# Patient Record
Sex: FEMALE | Race: WHITE | URBAN - NONMETROPOLITAN AREA
[De-identification: names, ages, dates, MRNs, and addresses within clinical notes are randomized per-mention and may not be internally consistent; named-entity substitution may affect disease eponyms.]

---

## 2017-02-22 DIAGNOSIS — I10 ESSENTIAL HYPERTENSION: ICD-10-CM

## 2017-02-22 RX ORDER — LISINOPRIL 10 MG/1
10 TABLET ORAL DAILY
Qty: 30 TABLET | Refills: 1 | Status: SHIPPED | OUTPATIENT
Start: 2017-02-22 | End: 2017-04-25

## 2017-04-25 ENCOUNTER — OFFICE VISIT (OUTPATIENT)
Dept: FAMILY MEDICINE CLINIC | Age: 37
End: 2017-04-25
Payer: MEDICAID

## 2017-04-25 ENCOUNTER — HOSPITAL ENCOUNTER (OUTPATIENT)
Dept: OTHER | Age: 37
Discharge: HOME | End: 2017-04-25
Attending: FAMILY MEDICINE | Admitting: FAMILY MEDICINE

## 2017-04-25 VITALS
HEART RATE: 79 BPM | OXYGEN SATURATION: 99 % | BODY MASS INDEX: 30.19 KG/M2 | DIASTOLIC BLOOD PRESSURE: 88 MMHG | SYSTOLIC BLOOD PRESSURE: 130 MMHG | RESPIRATION RATE: 18 BRPM | WEIGHT: 181.2 LBS | HEIGHT: 65 IN

## 2017-04-25 DIAGNOSIS — R53.1 WEAKNESS: ICD-10-CM

## 2017-04-25 DIAGNOSIS — E53.8 B12 DEFICIENCY: ICD-10-CM

## 2017-04-25 DIAGNOSIS — R42 DIZZINESS: Primary | ICD-10-CM

## 2017-04-25 DIAGNOSIS — I10 ESSENTIAL HYPERTENSION: ICD-10-CM

## 2017-04-25 LAB
A/G RATIO: 1.7 (ref 0.8–2)
ALBUMIN SERPL-MCNC: 4.4 G/DL (ref 3.4–4.8)
ALP BLD-CCNC: 71 U/L (ref 25–100)
ALT SERPL-CCNC: 13 U/L (ref 4–36)
ANION GAP SERPL CALCULATED.3IONS-SCNC: 11 MMOL/L (ref 3–16)
AST SERPL-CCNC: 15 U/L (ref 8–33)
BASOPHILS ABSOLUTE: 0 K/UL (ref 0–0.1)
BASOPHILS RELATIVE PERCENT: 0.7 %
BILIRUB SERPL-MCNC: 0.3 MG/DL (ref 0.3–1.2)
BUN BLDV-MCNC: 14 MG/DL (ref 6–20)
CALCIUM SERPL-MCNC: 10.2 MG/DL (ref 8.5–10.5)
CHLORIDE BLD-SCNC: 99 MMOL/L (ref 98–107)
CHOLESTEROL, TOTAL: 185 MG/DL (ref 0–200)
CO2: 29 MMOL/L (ref 20–30)
CREAT SERPL-MCNC: 0.9 MG/DL (ref 0.4–1.2)
EOSINOPHILS ABSOLUTE: 0.1 K/UL (ref 0–0.4)
EOSINOPHILS RELATIVE PERCENT: 1.7 %
FOLATE: 9.59 NG/ML
GFR AFRICAN AMERICAN: >59
GFR NON-AFRICAN AMERICAN: >60
GLOBULIN: 2.6 G/DL
GLUCOSE BLD-MCNC: 97 MG/DL (ref 74–106)
HCT VFR BLD CALC: 42.2 % (ref 37–47)
HDLC SERPL-MCNC: 59 MG/DL (ref 40–60)
HEMOGLOBIN: 14.5 G/DL (ref 11.5–16.5)
LDL CHOLESTEROL CALCULATED: 106 MG/DL
LYMPHOCYTES ABSOLUTE: 2 K/UL (ref 1.5–4)
LYMPHOCYTES RELATIVE PERCENT: 34.7 % (ref 20–40)
MCH RBC QN AUTO: 31 PG (ref 27–32)
MCHC RBC AUTO-ENTMCNC: 34.4 G/DL (ref 31–35)
MCV RBC AUTO: 90.4 FL (ref 80–100)
MONOCYTES ABSOLUTE: 0.5 K/UL (ref 0.2–0.8)
MONOCYTES RELATIVE PERCENT: 9 % (ref 3–10)
NEUTROPHILS ABSOLUTE: 3.1 K/UL (ref 2–7.5)
NEUTROPHILS RELATIVE PERCENT: 53.9 %
PDW BLD-RTO: 13.5 % (ref 11–16)
PLATELET # BLD: 318 K/UL (ref 150–400)
PMV BLD AUTO: 11.1 FL (ref 6–10)
POTASSIUM SERPL-SCNC: 5.5 MMOL/L (ref 3.4–5.1)
RBC # BLD: 4.67 M/UL (ref 3.8–5.8)
SODIUM BLD-SCNC: 139 MMOL/L (ref 136–145)
TOTAL PROTEIN: 7 G/DL (ref 6.4–8.3)
TRIGL SERPL-MCNC: 99 MG/DL (ref 0–249)
TSH SERPL DL<=0.05 MIU/L-ACNC: 1.97 UIU/ML (ref 0.35–5.5)
VITAMIN B-12: 354 PG/ML (ref 211–911)
VLDLC SERPL CALC-MCNC: 20 MG/DL
WBC # BLD: 5.8 K/UL (ref 4–11)

## 2017-04-25 PROCEDURE — 99214 OFFICE O/P EST MOD 30 MIN: CPT | Performed by: FAMILY MEDICINE

## 2017-04-25 PROCEDURE — 93000 ELECTROCARDIOGRAM COMPLETE: CPT | Performed by: FAMILY MEDICINE

## 2017-04-25 RX ORDER — LISINOPRIL 20 MG/1
20 TABLET ORAL DAILY
Qty: 30 TABLET | Refills: 3 | Status: SHIPPED | OUTPATIENT
Start: 2017-04-25 | End: 2018-07-19 | Stop reason: SINTOL

## 2017-04-25 ASSESSMENT — PATIENT HEALTH QUESTIONNAIRE - PHQ9
1. LITTLE INTEREST OR PLEASURE IN DOING THINGS: 0
SUM OF ALL RESPONSES TO PHQ QUESTIONS 1-9: 0
SUM OF ALL RESPONSES TO PHQ9 QUESTIONS 1 & 2: 0
2. FEELING DOWN, DEPRESSED OR HOPELESS: 0

## 2017-04-25 ASSESSMENT — ENCOUNTER SYMPTOMS: COLOR CHANGE: 1

## 2017-05-09 ENCOUNTER — OFFICE VISIT (OUTPATIENT)
Dept: FAMILY MEDICINE CLINIC | Age: 37
End: 2017-05-09
Payer: MEDICAID

## 2017-05-09 VITALS
HEIGHT: 65 IN | RESPIRATION RATE: 18 BRPM | SYSTOLIC BLOOD PRESSURE: 122 MMHG | OXYGEN SATURATION: 98 % | WEIGHT: 180 LBS | BODY MASS INDEX: 29.99 KG/M2 | DIASTOLIC BLOOD PRESSURE: 82 MMHG | HEART RATE: 73 BPM

## 2017-05-09 DIAGNOSIS — M54.2 NECK PAIN: Primary | ICD-10-CM

## 2017-05-09 DIAGNOSIS — I10 ESSENTIAL HYPERTENSION: ICD-10-CM

## 2017-05-09 DIAGNOSIS — M62.838 MUSCLE SPASM: ICD-10-CM

## 2017-05-09 DIAGNOSIS — E87.5 HYPERKALEMIA: ICD-10-CM

## 2017-05-09 PROCEDURE — 99214 OFFICE O/P EST MOD 30 MIN: CPT | Performed by: FAMILY MEDICINE

## 2017-05-09 RX ORDER — KETOROLAC TROMETHAMINE 30 MG/ML
60 INJECTION, SOLUTION INTRAMUSCULAR; INTRAVENOUS ONCE
Status: COMPLETED | OUTPATIENT
Start: 2017-05-09 | End: 2017-05-09

## 2017-05-09 RX ADMIN — KETOROLAC TROMETHAMINE 60 MG: 30 INJECTION, SOLUTION INTRAMUSCULAR; INTRAVENOUS at 10:29

## 2017-05-09 ASSESSMENT — ENCOUNTER SYMPTOMS: COLOR CHANGE: 1

## 2017-08-17 ENCOUNTER — OFFICE VISIT (OUTPATIENT)
Dept: FAMILY MEDICINE CLINIC | Age: 37
End: 2017-08-17
Payer: MEDICAID

## 2017-08-17 VITALS
OXYGEN SATURATION: 98 % | WEIGHT: 171.7 LBS | DIASTOLIC BLOOD PRESSURE: 86 MMHG | HEART RATE: 78 BPM | HEIGHT: 65 IN | SYSTOLIC BLOOD PRESSURE: 130 MMHG | RESPIRATION RATE: 18 BRPM | BODY MASS INDEX: 28.61 KG/M2

## 2017-08-17 DIAGNOSIS — F41.9 ANXIETY: ICD-10-CM

## 2017-08-17 DIAGNOSIS — G47.00 INSOMNIA, UNSPECIFIED TYPE: ICD-10-CM

## 2017-08-17 DIAGNOSIS — I10 ESSENTIAL HYPERTENSION: Primary | ICD-10-CM

## 2017-08-17 PROCEDURE — 99214 OFFICE O/P EST MOD 30 MIN: CPT | Performed by: FAMILY MEDICINE

## 2017-08-17 RX ORDER — VITAMIN B COMPLEX
1 CAPSULE ORAL DAILY
COMMUNITY
End: 2022-09-01

## 2017-08-17 ASSESSMENT — ENCOUNTER SYMPTOMS: COLOR CHANGE: 1

## 2017-08-21 PROBLEM — G47.00 INSOMNIA: Status: ACTIVE | Noted: 2017-08-21

## 2017-08-21 PROBLEM — F41.9 ANXIETY: Status: ACTIVE | Noted: 2017-08-21

## 2017-10-27 ENCOUNTER — TELEPHONE (OUTPATIENT)
Dept: FAMILY MEDICINE CLINIC | Age: 37
End: 2017-10-27

## 2017-10-27 RX ORDER — ONDANSETRON 4 MG/1
4 TABLET, FILM COATED ORAL EVERY 8 HOURS PRN
Qty: 30 TABLET | Refills: 0 | Status: SHIPPED | OUTPATIENT
Start: 2017-10-27 | End: 2022-09-01

## 2017-10-27 RX ORDER — SULFAMETHOXAZOLE AND TRIMETHOPRIM 800; 160 MG/1; MG/1
1 TABLET ORAL 2 TIMES DAILY
Qty: 14 TABLET | Refills: 0 | Status: SHIPPED | OUTPATIENT
Start: 2017-10-27 | End: 2017-11-03

## 2017-11-30 ENCOUNTER — TELEPHONE (OUTPATIENT)
Dept: FAMILY MEDICINE CLINIC | Age: 37
End: 2017-11-30

## 2017-11-30 RX ORDER — EPINEPHRINE 0.3 MG/.3ML
INJECTION SUBCUTANEOUS
Qty: 2 EACH | Refills: 5 | Status: SHIPPED | OUTPATIENT
Start: 2017-11-30

## 2017-11-30 NOTE — TELEPHONE ENCOUNTER
Patient states has had several episodes of throat closing after eating peanuts or foods with peanut oil. Per Dr. Scott Madsen verbal orders sent epi-pen to pharmacy. Patient will bring to office for instructions on how and when to use.

## 2018-01-26 RX ORDER — CEFDINIR 300 MG/1
300 CAPSULE ORAL 2 TIMES DAILY
Qty: 20 CAPSULE | Refills: 0 | Status: SHIPPED | OUTPATIENT
Start: 2018-01-26 | End: 2018-02-05

## 2018-04-04 ENCOUNTER — OFFICE VISIT (OUTPATIENT)
Dept: FAMILY MEDICINE CLINIC | Age: 38
End: 2018-04-04
Payer: MEDICAID

## 2018-04-04 VITALS
HEART RATE: 78 BPM | BODY MASS INDEX: 28.49 KG/M2 | HEIGHT: 65 IN | DIASTOLIC BLOOD PRESSURE: 88 MMHG | WEIGHT: 171 LBS | SYSTOLIC BLOOD PRESSURE: 138 MMHG | OXYGEN SATURATION: 98 % | RESPIRATION RATE: 20 BRPM

## 2018-04-04 DIAGNOSIS — T14.8XXA MUSCLE STRAIN: ICD-10-CM

## 2018-04-04 DIAGNOSIS — M62.838 MUSCLE SPASM: ICD-10-CM

## 2018-04-04 DIAGNOSIS — M54.2 NECK PAIN: Primary | ICD-10-CM

## 2018-04-04 PROCEDURE — 99213 OFFICE O/P EST LOW 20 MIN: CPT | Performed by: NURSE PRACTITIONER

## 2018-04-04 RX ORDER — METHOCARBAMOL 750 MG/1
750 TABLET, FILM COATED ORAL 4 TIMES DAILY
Qty: 120 TABLET | Refills: 3 | Status: SHIPPED | OUTPATIENT
Start: 2018-04-04 | End: 2018-07-19 | Stop reason: SDUPTHER

## 2018-04-04 RX ORDER — KETOROLAC TROMETHAMINE 30 MG/ML
60 INJECTION, SOLUTION INTRAMUSCULAR; INTRAVENOUS ONCE
Status: COMPLETED | OUTPATIENT
Start: 2018-04-04 | End: 2018-04-04

## 2018-04-04 RX ORDER — METHYLPREDNISOLONE SODIUM SUCCINATE 125 MG/2ML
125 INJECTION, POWDER, LYOPHILIZED, FOR SOLUTION INTRAMUSCULAR; INTRAVENOUS ONCE
Status: COMPLETED | OUTPATIENT
Start: 2018-04-04 | End: 2018-04-04

## 2018-04-04 RX ORDER — MELOXICAM 15 MG/1
15 TABLET ORAL DAILY
Qty: 30 TABLET | Refills: 3 | Status: SHIPPED | OUTPATIENT
Start: 2018-04-04 | End: 2018-07-19 | Stop reason: SDUPTHER

## 2018-04-04 RX ADMIN — METHYLPREDNISOLONE SODIUM SUCCINATE 125 MG: 125 INJECTION, POWDER, LYOPHILIZED, FOR SOLUTION INTRAMUSCULAR; INTRAVENOUS at 16:40

## 2018-04-04 RX ADMIN — KETOROLAC TROMETHAMINE 60 MG: 30 INJECTION, SOLUTION INTRAMUSCULAR; INTRAVENOUS at 16:39

## 2018-07-19 ENCOUNTER — HOSPITAL ENCOUNTER (OUTPATIENT)
Facility: HOSPITAL | Age: 38
Discharge: HOME | End: 2018-07-19
Payer: MEDICAID

## 2018-07-19 ENCOUNTER — OFFICE VISIT (OUTPATIENT)
Dept: FAMILY MEDICINE CLINIC | Age: 38
End: 2018-07-19
Payer: MEDICAID

## 2018-07-19 DIAGNOSIS — H53.8 BLURRY VISION: ICD-10-CM

## 2018-07-19 DIAGNOSIS — R20.2 LEFT FACE AND LEFT ARM TINGLING: ICD-10-CM

## 2018-07-19 DIAGNOSIS — Z13.29 THYROID DISORDER SCREENING: ICD-10-CM

## 2018-07-19 DIAGNOSIS — I10 ESSENTIAL HYPERTENSION: Primary | ICD-10-CM

## 2018-07-19 DIAGNOSIS — I10 ESSENTIAL HYPERTENSION: ICD-10-CM

## 2018-07-19 DIAGNOSIS — Z83.2 FAMILY HISTORY OF AUTOIMMUNE DISORDER: ICD-10-CM

## 2018-07-19 DIAGNOSIS — M62.838 MUSCLE SPASM: ICD-10-CM

## 2018-07-19 DIAGNOSIS — R51.9 FREQUENT HEADACHES: ICD-10-CM

## 2018-07-19 DIAGNOSIS — R53.83 FATIGUE, UNSPECIFIED TYPE: ICD-10-CM

## 2018-07-19 DIAGNOSIS — M54.2 NECK PAIN: ICD-10-CM

## 2018-07-19 DIAGNOSIS — T14.8XXA MUSCLE STRAIN: ICD-10-CM

## 2018-07-19 PROCEDURE — 86038 ANTINUCLEAR ANTIBODIES: CPT

## 2018-07-19 PROCEDURE — 85652 RBC SED RATE AUTOMATED: CPT

## 2018-07-19 PROCEDURE — 84443 ASSAY THYROID STIM HORMONE: CPT

## 2018-07-19 PROCEDURE — 80053 COMPREHEN METABOLIC PANEL: CPT

## 2018-07-19 PROCEDURE — 82306 VITAMIN D 25 HYDROXY: CPT

## 2018-07-19 PROCEDURE — 85025 COMPLETE CBC W/AUTO DIFF WBC: CPT

## 2018-07-19 PROCEDURE — 36415 COLL VENOUS BLD VENIPUNCTURE: CPT

## 2018-07-19 PROCEDURE — 86140 C-REACTIVE PROTEIN: CPT

## 2018-07-19 PROCEDURE — 82607 VITAMIN B-12: CPT

## 2018-07-19 PROCEDURE — 99214 OFFICE O/P EST MOD 30 MIN: CPT | Performed by: NURSE PRACTITIONER

## 2018-07-19 PROCEDURE — 86431 RHEUMATOID FACTOR QUANT: CPT

## 2018-07-19 RX ORDER — MELOXICAM 15 MG/1
15 TABLET ORAL DAILY
Qty: 30 TABLET | Refills: 3 | Status: SHIPPED | OUTPATIENT
Start: 2018-07-19 | End: 2018-12-19 | Stop reason: SDUPTHER

## 2018-07-19 RX ORDER — METHOCARBAMOL 750 MG/1
750 TABLET, FILM COATED ORAL 4 TIMES DAILY
Qty: 120 TABLET | Refills: 0 | COMMUNITY
Start: 2018-07-19 | End: 2022-09-01

## 2018-07-19 ASSESSMENT — PATIENT HEALTH QUESTIONNAIRE - PHQ9
SUM OF ALL RESPONSES TO PHQ QUESTIONS 1-9: 0
1. LITTLE INTEREST OR PLEASURE IN DOING THINGS: 0
SUM OF ALL RESPONSES TO PHQ9 QUESTIONS 1 & 2: 0
2. FEELING DOWN, DEPRESSED OR HOPELESS: 0

## 2018-07-20 LAB
A/G RATIO: 1.9 (ref 0.8–2)
ALBUMIN SERPL-MCNC: 4.4 G/DL (ref 3.4–4.8)
ALP BLD-CCNC: 75 U/L (ref 25–100)
ALT SERPL-CCNC: 14 U/L (ref 4–36)
ANION GAP SERPL CALCULATED.3IONS-SCNC: 12 MMOL/L (ref 3–16)
AST SERPL-CCNC: 19 U/L (ref 8–33)
BASOPHILS ABSOLUTE: 0.1 K/UL (ref 0–0.1)
BASOPHILS RELATIVE PERCENT: 1.2 %
BILIRUB SERPL-MCNC: 0.3 MG/DL (ref 0.3–1.2)
BUN BLDV-MCNC: 19 MG/DL (ref 6–20)
C-REACTIVE PROTEIN: 0.8 MG/L (ref 0–5.1)
CALCIUM SERPL-MCNC: 9.5 MG/DL (ref 8.5–10.5)
CHLORIDE BLD-SCNC: 102 MMOL/L (ref 98–107)
CO2: 26 MMOL/L (ref 20–30)
CREAT SERPL-MCNC: 0.8 MG/DL (ref 0.4–1.2)
EOSINOPHILS ABSOLUTE: 0.4 K/UL (ref 0–0.4)
EOSINOPHILS RELATIVE PERCENT: 4 %
GFR AFRICAN AMERICAN: >59
GFR NON-AFRICAN AMERICAN: >60
GLOBULIN: 2.3 G/DL
GLUCOSE BLD-MCNC: 88 MG/DL (ref 74–106)
HCT VFR BLD CALC: 43.6 % (ref 37–47)
HEMOGLOBIN: 14.4 G/DL (ref 11.5–16.5)
IMMATURE GRANULOCYTES #: 0 K/UL
IMMATURE GRANULOCYTES %: 0.1 % (ref 0–5)
LYMPHOCYTES ABSOLUTE: 2.6 K/UL (ref 1.5–4)
LYMPHOCYTES RELATIVE PERCENT: 29.2 %
MCH RBC QN AUTO: 30.7 PG (ref 27–32)
MCHC RBC AUTO-ENTMCNC: 33 G/DL (ref 31–35)
MCV RBC AUTO: 93 FL (ref 80–100)
MONOCYTES ABSOLUTE: 0.7 K/UL (ref 0.2–0.8)
MONOCYTES RELATIVE PERCENT: 8.1 %
NEUTROPHILS ABSOLUTE: 5.1 K/UL (ref 2–7.5)
NEUTROPHILS RELATIVE PERCENT: 57.4 %
PDW BLD-RTO: 13.3 % (ref 11–16)
PLATELET # BLD: 236 K/UL (ref 150–400)
PMV BLD AUTO: 12.2 FL (ref 6–10)
POTASSIUM SERPL-SCNC: 4.8 MMOL/L (ref 3.4–5.1)
RBC # BLD: 4.69 M/UL (ref 3.8–5.8)
RHEUMATOID FACTOR: <10 IU/ML
SEDIMENTATION RATE, ERYTHROCYTE: 4 MM/HR (ref 0–20)
SODIUM BLD-SCNC: 140 MMOL/L (ref 136–145)
TOTAL PROTEIN: 6.7 G/DL (ref 6.4–8.3)
TSH REFLEX: 2.18 UIU/ML (ref 0.35–5.5)
VITAMIN B-12: 347 PG/ML (ref 211–911)
VITAMIN D 25-HYDROXY: 23.1 (ref 32–100)
WBC # BLD: 8.8 K/UL (ref 4–11)

## 2018-07-22 LAB
ANA INTERPRETATION: NORMAL
ANTI-NUCLEAR ANTIBODY (ANA): NEGATIVE

## 2018-07-30 ENCOUNTER — TELEPHONE (OUTPATIENT)
Dept: FAMILY MEDICINE CLINIC | Age: 38
End: 2018-07-30

## 2018-07-31 RX ORDER — ERGOCALCIFEROL 1.25 MG/1
50000 CAPSULE ORAL WEEKLY
Qty: 4 CAPSULE | Refills: 2 | Status: SHIPPED | OUTPATIENT
Start: 2018-07-31 | End: 2018-11-13 | Stop reason: SDUPTHER

## 2018-07-31 NOTE — TELEPHONE ENCOUNTER
Her labs look wonderful and are all normal. Her vitamin D was low and I just called in a supplement. Sorry I thought I told you they looked.

## 2018-08-29 VITALS
WEIGHT: 172 LBS | DIASTOLIC BLOOD PRESSURE: 88 MMHG | RESPIRATION RATE: 18 BRPM | OXYGEN SATURATION: 98 % | HEIGHT: 65 IN | SYSTOLIC BLOOD PRESSURE: 136 MMHG | BODY MASS INDEX: 28.66 KG/M2 | HEART RATE: 72 BPM

## 2018-08-29 ASSESSMENT — ENCOUNTER SYMPTOMS: BACK PAIN: 1

## 2018-08-29 NOTE — PROGRESS NOTES
SUBJECTIVE:  Johnathan Trinidad is a 40 y.o. female that presents with   Chief Complaint   Patient presents with    Arthritis     f/u   . HPI:    She has a history of hypertension and blood pressures do run high if she is under stress or having any type of pain. She's had a recent increase in neck pain that radiates across her shoulders and into the back of her head. She has muscular tightness across her shoulders and frequent headaches. She had an episode of left facial tingling and blurry vision that quickly resolved and has not occurred again. She denies any nausea or vomiting. She's not had any recent injurious events. She has a history of anxiety and muscle tension that could be contributing to her symptoms. She denies any shortness of air or chest pain. Review of Systems   Constitutional: Positive for activity change and fatigue. Musculoskeletal: Positive for arthralgias, back pain, myalgias, neck pain and neck stiffness. Neurological: Positive for headaches. Psychiatric/Behavioral: Positive for dysphoric mood and sleep disturbance. The patient is nervous/anxious. All other systems reviewed and are negative. OBJECTIVE:  Wt Readings from Last 2 Encounters:   07/19/18 172 lb (78 kg)   04/04/18 171 lb (77.6 kg)     BP Readings from Last 2 Encounters:   07/19/18 136/88   04/04/18 138/88      Pulse Readings from Last 2 Encounters:   07/19/18 72   04/04/18 78     Body mass index is 28.62 kg/m². @LZXCYAL0(9)@  Resp Readings from Last 2 Encounters:   07/19/18 18   04/04/18 20       Physical Exam   Constitutional: She is oriented to person, place, and time. She appears well-developed and well-nourished. HENT:   Head: Normocephalic and atraumatic. Right Ear: External ear normal.   Left Ear: External ear normal.   Nose: Nose normal.   Mouth/Throat: Oropharynx is clear and moist.   Eyes: Pupils are equal, round, and reactive to light.  Conjunctivae are normal.   Neck: Normal range of motion. Neck supple. Cardiovascular: Normal rate, regular rhythm, normal heart sounds and intact distal pulses. Pulmonary/Chest: Effort normal and breath sounds normal.   Abdominal: Soft. Bowel sounds are normal.   Musculoskeletal:        Cervical back: She exhibits decreased range of motion, tenderness, pain and spasm. Increased muscle tightness across shoulders bilaterally. Neurological: She is alert and oriented to person, place, and time. Skin: Skin is warm and dry. Psychiatric: Her speech is normal and behavior is normal. Judgment and thought content normal. Her mood appears anxious. Cognition and memory are normal.   Nursing note and vitals reviewed. No results found for requested labs within last 30 days. Microscopic Examination (no units)   Date Value   01/02/2017 YES     LDL Calculated (mg/dL)   Date Value   04/25/2017 106       Lab Results   Component Value Date    WBC 8.8 07/19/2018    NEUTROABS 5.1 07/19/2018    HGB 14.4 07/19/2018    HCT 43.6 07/19/2018    MCV 93.0 07/19/2018     07/19/2018     Lab Results   Component Value Date    TSH 1.97 04/25/2017         ASSESSMENT/PLAN:  Aj Hickey was seen today for arthritis. Diagnoses and all orders for this visit:    Essential hypertension  -     CBC Auto Differential; Future  -     Comprehensive Metabolic Panel; Future    Left face and left arm tingling  -     CBC Auto Differential; Future  -     Comprehensive Metabolic Panel; Future  -     Vitamin B12; Future    Blurry vision  -     CBC Auto Differential; Future  -     Comprehensive Metabolic Panel; Future    Frequent headaches    Muscle strain  -     meloxicam (MOBIC) 15 MG tablet; Take 1 tablet by mouth daily    Neck pain  -     XR CERVICAL SPINE (4-5 VIEWS); Future  -     CBC Auto Differential; Future  -     Comprehensive Metabolic Panel; Future  -     Vitamin B12; Future  -     ARDEN; Future  -     RHEUMATOID FACTOR; Future  -     SEDIMENTATION RATE;  Future  -     C-REACTIVE

## 2018-09-04 ENCOUNTER — TELEPHONE (OUTPATIENT)
Dept: FAMILY MEDICINE CLINIC | Age: 38
End: 2018-09-04

## 2018-09-04 RX ORDER — SULFAMETHOXAZOLE AND TRIMETHOPRIM 800; 160 MG/1; MG/1
1 TABLET ORAL 2 TIMES DAILY
Qty: 20 TABLET | Refills: 0 | Status: SHIPPED | OUTPATIENT
Start: 2018-09-04 | End: 2018-09-14

## 2018-09-04 NOTE — TELEPHONE ENCOUNTER
Patient states has ear pain, sinus drainage, facial pain. Thinks has sinus infection. Per Dr. Idalmis Martinez verbal order sent bactrim ds to pharmacy. Patient notified.

## 2018-10-16 ENCOUNTER — HOSPITAL ENCOUNTER (OUTPATIENT)
Facility: HOSPITAL | Age: 38
Discharge: HOME | End: 2018-10-16
Payer: MEDICAID

## 2018-10-16 ENCOUNTER — OFFICE VISIT (OUTPATIENT)
Dept: FAMILY MEDICINE CLINIC | Age: 38
End: 2018-10-16
Payer: MEDICAID

## 2018-10-16 VITALS
SYSTOLIC BLOOD PRESSURE: 132 MMHG | DIASTOLIC BLOOD PRESSURE: 82 MMHG | OXYGEN SATURATION: 99 % | HEART RATE: 100 BPM | BODY MASS INDEX: 28.66 KG/M2 | HEIGHT: 65 IN | WEIGHT: 172 LBS | RESPIRATION RATE: 18 BRPM

## 2018-10-16 DIAGNOSIS — R19.7 DIARRHEA, UNSPECIFIED TYPE: ICD-10-CM

## 2018-10-16 DIAGNOSIS — R52 BODY ACHES: ICD-10-CM

## 2018-10-16 DIAGNOSIS — R10.84 GENERALIZED ABDOMINAL PAIN: ICD-10-CM

## 2018-10-16 DIAGNOSIS — K52.9 ACUTE GASTROENTERITIS: Primary | ICD-10-CM

## 2018-10-16 DIAGNOSIS — R68.83 CHILLS: ICD-10-CM

## 2018-10-16 DIAGNOSIS — R11.2 NAUSEA AND VOMITING, INTRACTABILITY OF VOMITING NOT SPECIFIED, UNSPECIFIED VOMITING TYPE: ICD-10-CM

## 2018-10-16 LAB
A/G RATIO: 1.7 (ref 0.8–2)
ALBUMIN SERPL-MCNC: 4.3 G/DL (ref 3.4–4.8)
ALP BLD-CCNC: 91 U/L (ref 25–100)
ALT SERPL-CCNC: 42 U/L (ref 4–36)
ANION GAP SERPL CALCULATED.3IONS-SCNC: 11 MMOL/L (ref 3–16)
AST SERPL-CCNC: 34 U/L (ref 8–33)
BASOPHILS ABSOLUTE: 0.1 K/UL (ref 0–0.1)
BASOPHILS RELATIVE PERCENT: 0.7 %
BILIRUB SERPL-MCNC: <0.2 MG/DL (ref 0.3–1.2)
BUN BLDV-MCNC: 12 MG/DL (ref 6–20)
CALCIUM SERPL-MCNC: 8.7 MG/DL (ref 8.5–10.5)
CHLORIDE BLD-SCNC: 101 MMOL/L (ref 98–107)
CO2: 28 MMOL/L (ref 20–30)
CREAT SERPL-MCNC: 0.6 MG/DL (ref 0.4–1.2)
EOSINOPHILS ABSOLUTE: 0.3 K/UL (ref 0–0.4)
EOSINOPHILS RELATIVE PERCENT: 3.5 %
GFR AFRICAN AMERICAN: >59
GFR NON-AFRICAN AMERICAN: >60
GLOBULIN: 2.5 G/DL
GLUCOSE BLD-MCNC: 84 MG/DL (ref 74–106)
HAV IGM SER IA-ACNC: NORMAL
HCT VFR BLD CALC: 44.1 % (ref 37–47)
HEMOGLOBIN: 14.7 G/DL (ref 11.5–16.5)
IMMATURE GRANULOCYTES #: 0 K/UL
IMMATURE GRANULOCYTES %: 0.3 % (ref 0–5)
INFLUENZA A ANTIBODY: NEGATIVE
INFLUENZA B ANTIBODY: NEGATIVE
LYMPHOCYTES ABSOLUTE: 1.7 K/UL (ref 1.5–4)
LYMPHOCYTES RELATIVE PERCENT: 21.7 %
MCH RBC QN AUTO: 30.7 PG (ref 27–32)
MCHC RBC AUTO-ENTMCNC: 33.3 G/DL (ref 31–35)
MCV RBC AUTO: 92.1 FL (ref 80–100)
MONOCYTES ABSOLUTE: 0.6 K/UL (ref 0.2–0.8)
MONOCYTES RELATIVE PERCENT: 8.4 %
NEUTROPHILS ABSOLUTE: 5 K/UL (ref 2–7.5)
NEUTROPHILS RELATIVE PERCENT: 65.4 %
PDW BLD-RTO: 13.7 % (ref 11–16)
PLATELET # BLD: 200 K/UL (ref 150–400)
PMV BLD AUTO: 11.4 FL (ref 6–10)
POTASSIUM SERPL-SCNC: 3.9 MMOL/L (ref 3.4–5.1)
RBC # BLD: 4.79 M/UL (ref 3.8–5.8)
SODIUM BLD-SCNC: 140 MMOL/L (ref 136–145)
TOTAL PROTEIN: 6.8 G/DL (ref 6.4–8.3)
WBC # BLD: 7.6 K/UL (ref 4–11)

## 2018-10-16 PROCEDURE — 87804 INFLUENZA ASSAY W/OPTIC: CPT | Performed by: NURSE PRACTITIONER

## 2018-10-16 PROCEDURE — 86708 HEPATITIS A ANTIBODY: CPT

## 2018-10-16 PROCEDURE — 80053 COMPREHEN METABOLIC PANEL: CPT

## 2018-10-16 PROCEDURE — 86709 HEPATITIS A IGM ANTIBODY: CPT

## 2018-10-16 PROCEDURE — 36415 COLL VENOUS BLD VENIPUNCTURE: CPT

## 2018-10-16 PROCEDURE — 85025 COMPLETE CBC W/AUTO DIFF WBC: CPT

## 2018-10-16 PROCEDURE — 99213 OFFICE O/P EST LOW 20 MIN: CPT | Performed by: NURSE PRACTITIONER

## 2018-10-16 RX ORDER — ONDANSETRON 8 MG/1
8 TABLET, ORALLY DISINTEGRATING ORAL
Qty: 30 TABLET | Refills: 1 | Status: SHIPPED | OUTPATIENT
Start: 2018-10-16 | End: 2022-09-01

## 2018-10-16 RX ORDER — DICYCLOMINE HCL 20 MG
20 TABLET ORAL 4 TIMES DAILY PRN
Qty: 60 TABLET | Refills: 1 | Status: SHIPPED | OUTPATIENT
Start: 2018-10-16 | End: 2022-09-01

## 2018-10-16 ASSESSMENT — ENCOUNTER SYMPTOMS
RESPIRATORY NEGATIVE: 1
ALLERGIC/IMMUNOLOGIC NEGATIVE: 1
EYES NEGATIVE: 1

## 2018-10-18 LAB — HAV AB SERPL IA-ACNC: NEGATIVE

## 2018-11-09 ASSESSMENT — ENCOUNTER SYMPTOMS
VOMITING: 1
DIARRHEA: 1
ABDOMINAL PAIN: 1
NAUSEA: 1

## 2018-11-13 RX ORDER — ERGOCALCIFEROL 1.25 MG/1
50000 CAPSULE ORAL WEEKLY
Qty: 4 CAPSULE | Refills: 3 | Status: SHIPPED | OUTPATIENT
Start: 2018-11-13 | End: 2022-09-01

## 2018-11-21 ENCOUNTER — HOSPITAL ENCOUNTER (OUTPATIENT)
Facility: HOSPITAL | Age: 38
Discharge: HOME | End: 2018-11-21
Payer: MEDICAID

## 2018-11-21 ENCOUNTER — TELEPHONE (OUTPATIENT)
Dept: FAMILY MEDICINE CLINIC | Age: 38
End: 2018-11-21

## 2018-11-21 DIAGNOSIS — R30.0 DYSURIA: ICD-10-CM

## 2018-11-21 DIAGNOSIS — R10.9 FLANK PAIN: ICD-10-CM

## 2018-11-21 DIAGNOSIS — R31.9 HEMATURIA, UNSPECIFIED TYPE: Primary | ICD-10-CM

## 2018-11-21 PROCEDURE — 87086 URINE CULTURE/COLONY COUNT: CPT

## 2018-11-21 RX ORDER — CIPROFLOXACIN 500 MG/1
500 TABLET, FILM COATED ORAL 2 TIMES DAILY
Qty: 20 TABLET | Refills: 0 | Status: SHIPPED | OUTPATIENT
Start: 2018-11-21 | End: 2018-12-01

## 2018-11-23 LAB — URINE CULTURE, ROUTINE: NORMAL

## 2018-12-11 ENCOUNTER — OFFICE VISIT (OUTPATIENT)
Dept: FAMILY MEDICINE CLINIC | Age: 38
End: 2018-12-11
Payer: MEDICAID

## 2018-12-11 VITALS
OXYGEN SATURATION: 89 % | RESPIRATION RATE: 20 BRPM | DIASTOLIC BLOOD PRESSURE: 89 MMHG | HEART RATE: 78 BPM | SYSTOLIC BLOOD PRESSURE: 138 MMHG | HEIGHT: 65 IN | BODY MASS INDEX: 29.16 KG/M2 | WEIGHT: 175 LBS

## 2018-12-11 DIAGNOSIS — F41.9 ANXIETY: Primary | ICD-10-CM

## 2018-12-11 PROCEDURE — 96160 PT-FOCUSED HLTH RISK ASSMT: CPT | Performed by: FAMILY MEDICINE

## 2018-12-11 PROCEDURE — 99213 OFFICE O/P EST LOW 20 MIN: CPT | Performed by: FAMILY MEDICINE

## 2018-12-11 ASSESSMENT — PATIENT HEALTH QUESTIONNAIRE - PHQ9
4. FEELING TIRED OR HAVING LITTLE ENERGY: 1
2. FEELING DOWN, DEPRESSED OR HOPELESS: 2
3. TROUBLE FALLING OR STAYING ASLEEP: 2
SUM OF ALL RESPONSES TO PHQ QUESTIONS 1-9: 10
5. POOR APPETITE OR OVEREATING: 1
7. TROUBLE CONCENTRATING ON THINGS, SUCH AS READING THE NEWSPAPER OR WATCHING TELEVISION: 0
SUM OF ALL RESPONSES TO PHQ9 QUESTIONS 1 & 2: 5
6. FEELING BAD ABOUT YOURSELF - OR THAT YOU ARE A FAILURE OR HAVE LET YOURSELF OR YOUR FAMILY DOWN: 0
9. THOUGHTS THAT YOU WOULD BE BETTER OFF DEAD, OR OF HURTING YOURSELF: 0
1. LITTLE INTEREST OR PLEASURE IN DOING THINGS: 3
SUM OF ALL RESPONSES TO PHQ QUESTIONS 1-9: 10
8. MOVING OR SPEAKING SO SLOWLY THAT OTHER PEOPLE COULD HAVE NOTICED. OR THE OPPOSITE, BEING SO FIGETY OR RESTLESS THAT YOU HAVE BEEN MOVING AROUND A LOT MORE THAN USUAL: 1
10. IF YOU CHECKED OFF ANY PROBLEMS, HOW DIFFICULT HAVE THESE PROBLEMS MADE IT FOR YOU TO DO YOUR WORK, TAKE CARE OF THINGS AT HOME, OR GET ALONG WITH OTHER PEOPLE: 0

## 2018-12-11 ASSESSMENT — ENCOUNTER SYMPTOMS: COLOR CHANGE: 1

## 2018-12-19 DIAGNOSIS — M54.2 NECK PAIN: ICD-10-CM

## 2018-12-19 DIAGNOSIS — T14.8XXA MUSCLE STRAIN: ICD-10-CM

## 2018-12-19 RX ORDER — MELOXICAM 15 MG/1
15 TABLET ORAL DAILY
Qty: 30 TABLET | Refills: 3 | Status: SHIPPED | OUTPATIENT
Start: 2018-12-19 | End: 2022-09-01

## 2019-02-25 RX ORDER — OSELTAMIVIR PHOSPHATE 75 MG/1
75 CAPSULE ORAL DAILY
Qty: 10 CAPSULE | Refills: 0 | Status: SHIPPED | OUTPATIENT
Start: 2019-02-25 | End: 2019-03-07

## 2019-06-12 ENCOUNTER — HOSPITAL ENCOUNTER (OUTPATIENT)
Facility: HOSPITAL | Age: 39
Discharge: HOME | End: 2019-06-12
Payer: MEDICAID

## 2019-06-12 ENCOUNTER — OFFICE VISIT (OUTPATIENT)
Dept: FAMILY MEDICINE CLINIC | Age: 39
End: 2019-06-12
Payer: MEDICAID

## 2019-06-12 VITALS
RESPIRATION RATE: 18 BRPM | TEMPERATURE: 97.8 F | WEIGHT: 178 LBS | HEART RATE: 95 BPM | DIASTOLIC BLOOD PRESSURE: 70 MMHG | OXYGEN SATURATION: 99 % | HEIGHT: 65 IN | SYSTOLIC BLOOD PRESSURE: 130 MMHG | BODY MASS INDEX: 29.66 KG/M2

## 2019-06-12 DIAGNOSIS — K29.70 VIRAL GASTRITIS: Primary | ICD-10-CM
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
## 2019-06-12 DIAGNOSIS — H66.91 RIGHT OTITIS MEDIA, UNSPECIFIED OTITIS MEDIA TYPE: ICD-10-CM

## 2019-06-12 DIAGNOSIS — S30.861A TICK BITE OF ABDOMEN, INITIAL ENCOUNTER: ICD-10-CM

## 2019-06-12 PROCEDURE — 99213 OFFICE O/P EST LOW 20 MIN: CPT | Performed by: NURSE PRACTITIONER

## 2019-06-12 PROCEDURE — 86757 RICKETTSIA ANTIBODY: CPT

## 2019-06-12 PROCEDURE — 86618 LYME DISEASE ANTIBODY: CPT

## 2019-06-12 RX ORDER — DOXYCYCLINE HYCLATE 100 MG/1
100 CAPSULE ORAL 2 TIMES DAILY
Qty: 20 CAPSULE | Refills: 0 | Status: SHIPPED | OUTPATIENT
Start: 2019-06-12 | End: 2019-06-22

## 2019-06-12 RX ORDER — ONDANSETRON 4 MG/1
4 TABLET, ORALLY DISINTEGRATING ORAL EVERY 8 HOURS PRN
Qty: 21 TABLET | Refills: 0 | Status: SHIPPED | OUTPATIENT
Start: 2019-06-12 | End: 2022-09-01

## 2019-06-12 RX ORDER — DICYCLOMINE HYDROCHLORIDE 10 MG/1
10 CAPSULE ORAL 4 TIMES DAILY
Qty: 120 CAPSULE | Refills: 0 | Status: SHIPPED | OUTPATIENT
Start: 2019-06-12 | End: 2022-09-01

## 2019-06-12 ASSESSMENT — PATIENT HEALTH QUESTIONNAIRE - PHQ9
1. LITTLE INTEREST OR PLEASURE IN DOING THINGS: 0
SUM OF ALL RESPONSES TO PHQ QUESTIONS 1-9: 0
SUM OF ALL RESPONSES TO PHQ QUESTIONS 1-9: 0
2. FEELING DOWN, DEPRESSED OR HOPELESS: 0
SUM OF ALL RESPONSES TO PHQ9 QUESTIONS 1 & 2: 0

## 2019-06-12 ASSESSMENT — ENCOUNTER SYMPTOMS
NAUSEA: 1
SORE THROAT: 1
VOMITING: 1
SWOLLEN GLANDS: 1

## 2019-06-12 NOTE — PROGRESS NOTES
Dispense Refill    ondansetron (ZOFRAN ODT) 4 MG disintegrating tablet Take 1 tablet by mouth every 8 hours as needed for Nausea or Vomiting 21 tablet 0    dicyclomine (BENTYL) 10 MG capsule Take 1 capsule by mouth 4 times daily 120 capsule 0    doxycycline hyclate (VIBRAMYCIN) 100 MG capsule Take 1 capsule by mouth 2 times daily for 10 days 20 capsule 0    vitamin D (ERGOCALCIFEROL) 37208 units CAPS capsule Take 1 capsule by mouth once a week 4 capsule 3    b complex vitamins capsule Take 1 capsule by mouth daily      meloxicam (MOBIC) 15 MG tablet Take 1 tablet by mouth daily 30 tablet 3    ondansetron (ZOFRAN-ODT) 8 MG TBDP disintegrating tablet Place 1 tablet under the tongue every 6-8 hours as needed for Nausea or Vomiting 30 tablet 1    dicyclomine (BENTYL) 20 MG tablet Take 1 tablet by mouth 4 times daily as needed (diarrhea) 60 tablet 1    methocarbamol (ROBAXIN) 750 MG tablet Take 1 tablet by mouth 4 times daily 120 tablet 0    EPINEPHrine (EPIPEN 2-LUDWIG) 0.3 MG/0.3ML SOAJ injection Use as directed for allergic reaction 2 each 5    ondansetron (ZOFRAN) 4 MG tablet Take 1 tablet by mouth every 8 hours as needed for Nausea or Vomiting 30 tablet 0     No current facility-administered medications for this visit. Review of Systems   Constitutional: Positive for fatigue. Negative for chills and fever. HENT: Positive for sore throat. Gastrointestinal: Positive for abdominal pain, nausea and vomiting. Change in bowel habit:  diarrhea. Skin: Negative for rash. Hematological: Positive for adenopathy. All other systems reviewed and are negative. OBJECTIVE:    /70   Pulse 95   Temp 97.8 °F (36.6 °C)   Resp 18   Ht 5' 5\" (1.651 m)   Wt 178 lb (80.7 kg)   SpO2 99% Comment: room air  Breastfeeding? No   BMI 29.62 kg/m²   BP Readings from Last 3 Encounters:   06/12/19 130/70   12/11/18 138/89   10/16/18 132/82     Body mass index is 29.62 kg/m².     Physical Exam (VIBRAMYCIN) 100 MG capsule; Take 1 capsule by mouth 2 times daily for 10 days    Right otitis media, unspecified otitis media type  -     doxycycline hyclate (VIBRAMYCIN) 100 MG capsule; Take 1 capsule by mouth 2 times daily for 10 days  - Increase Fluids   Tylenol or ibuprofen for pain/fever   Finish all antibiotics even if you begin to feel better. There are no discontinued medications. PATIENT COUNSELING     Counseling was provided today regarding the following topics: Healthy eating habits, Regular exercise, substance abuse and healthy sleep habits. Discussed use, benefit, and side effects of prescribed medications. Barriers to medication compliance addressed. All patient questions answered. Patient voices understanding. Return if symptoms worsen or fail to improve. BETY Mosquera     Much of this encounter note is an electronic transcription of spoken language to printed text. Electronic transcription of spoken language may permit erroneous words or phrases to be inadvertently transcribed. Although I have reviewed this note for such errors, some still exist in this documentation.

## 2019-06-12 NOTE — PATIENT INSTRUCTIONS
dispose of used patches by folding them in half so that the sticky sides meet, and then flushing them down a toilet. They should not be placed in the household trash where children or pets can find them. · If you have any questions, ask your provider or pharmacist for more information. · Be sure to keep all appointments for provider visits or tests. We are committed to providing you with the best care possible. In order to help us achieve these goals please remember to bring all medications, herbal products, and over the counter supplements with you to each visit. If your provider has ordered testing for you, please be sure to follow up with our office if you have not received results within 7 days after the testing took place. *If you receive a survey after visiting one of our offices, please take time to share your experience concerning your physician office visit. These surveys are confidential and no health information about you is shared. We are eager to improve for you and we are counting on your feedback to help make that happen. Patient Education        Gastritis: Care Instructions  Your Care Instructions    Gastritis is a sore and upset stomach. It happens when something irritates the stomach lining. Many things can cause it. These include an infection such as the flu or something you ate or drank. Medicines or a sore on the lining of the stomach (ulcer) also can cause it. Your belly may bloat and ache. You may belch, vomit, and feel sick to your stomach. You should be able to relieve the problem by taking medicine. And it may help to change your diet. If gastritis lasts, your doctor may prescribe medicine. Follow-up care is a key part of your treatment and safety. Be sure to make and go to all appointments, and call your doctor if you are having problems. It's also a good idea to know your test results and keep a list of the medicines you take.   How can you care for yourself at home?  If your doctor prescribed antibiotics, take them as directed. Do not stop taking them just because you feel better. You need to take the full course of antibiotics. Be safe with medicines. If your doctor prescribed medicine to decrease stomach acid, take it as directed. Call your doctor if you think you are having a problem with your medicine. Do not take any other medicine, including over-the-counter pain relievers, without talking to your doctor first.  If your doctor recommends over-the-counter medicine to reduce stomach acid, such as Pepcid AC, Prilosec, Tagamet HB, or Zantac 75, follow the directions on the label. Drink plenty of fluids (enough so that your urine is light yellow or clear like water) to prevent dehydration. Choose water and other caffeine-free clear liquids. If you have kidney, heart, or liver disease and have to limit fluids, talk with your doctor before you increase the amount of fluids you drink. Limit how much alcohol you drink. Avoid coffee, tea, cola drinks, chocolate, and other foods with caffeine. They increase stomach acid. When should you call for help? Call 911 anytime you think you may need emergency care. For example, call if:    You vomit blood or what looks like coffee grounds.     You pass maroon or very bloody stools.    Call your doctor now or seek immediate medical care if:    You start breathing fast and have not produced urine in the last 8 hours.     You cannot keep fluids down.    Watch closely for changes in your health, and be sure to contact your doctor if:    You do not get better as expected. Where can you learn more? Go to https://torie.LatamLeap. org and sign in to your [employer] account. Enter [id number] in the [facility] box to learn more about \"Gastritis: Care Instructions. \"     If you do not have an account, please click on the \"Sign Up Now\" link.   Current as of: November 7, 2018  Content Version: 12.0  © 1772-0052 [employer]. Care instructions adapted under license by [facility] ([facility]). If you have questions about a medical condition or this instruction, always ask your healthcare professional. [address] any warranty or liability for your use of this information. Patient Education        Gastritis: Care Instructions  Your Care Instructions    Gastritis is a sore and upset stomach. It happens when something irritates the stomach lining. Many things can cause it. These include an infection such as the flu or something you ate or drank. Medicines or a sore on the lining of the stomach (ulcer) also can cause it. Your belly may bloat and ache. You may belch, vomit, and feel sick to your stomach. You should be able to relieve the problem by taking medicine. And it may help to change your diet. If gastritis lasts, your doctor may prescribe medicine. Follow-up care is a key part of your treatment and safety. Be sure to make and go to all appointments, and call your doctor if you are having problems. It's also a good idea to know your test results and keep a list of the medicines you take. How can you care for yourself at home? If your doctor prescribed antibiotics, take them as directed. Do not stop taking them just because you feel better. You need to take the full course of antibiotics. Be safe with medicines. If your doctor prescribed medicine to decrease stomach acid, take it as directed. Call your doctor if you think you are having a problem with your medicine. Do not take any other medicine, including over-the-counter pain relievers, without talking to your doctor first.  If your doctor recommends over-the-counter medicine to reduce stomach acid, such as Pepcid AC, Prilosec, Tagamet HB, or Zantac 75, follow the directions on the label. Drink plenty of fluids (enough so that your urine is light yellow or clear like water) to prevent dehydration.  Choose water and other caffeine-free clear liquids. If you have kidney, heart, or liver disease and have to limit fluids, talk with your doctor before you increase the amount of fluids you drink. Limit how much alcohol you drink. Avoid coffee, tea, cola drinks, chocolate, and other foods with caffeine. They increase stomach acid. When should you call for help? Call 911 anytime you think you may need emergency care. For example, call if:    You vomit blood or what looks like coffee grounds.     You pass maroon or very bloody stools.    Call your doctor now or seek immediate medical care if:    You start breathing fast and have not produced urine in the last 8 hours.     You cannot keep fluids down.    Watch closely for changes in your health, and be sure to contact your doctor if:    You do not get better as expected. Where can you learn more? Go to https://chwolfgangeb."[email or web address]. org and sign in to your [employer] account. Enter [id number] in the [facility] box to learn more about \"Gastritis: Care Instructions. \"     If you do not have an account, please click on the \"Sign Up Now\" link. Current as of: November 7, 2018  Content Version: 12.0  © 6130-5607 [employer]. Care instructions adapted under license by [facility] ([facility]). If you have questions about a medical condition or this instruction, always ask your healthcare professional. [place] [address] any warranty or liability for your use of this information. Patient Education        Tick Bite: Care Instructions  Your Care Instructions    Ticks are small spiderlike animals. They bite to fasten themselves onto your skin and feed on your blood. Ticks can carry diseases. But most ticks do not carry diseases, and most tick bites do not cause serious health problems. Some people may have an allergic reaction to a tick bite. This reaction may be mild, with symptoms like itching and swelling. In rare cases, a severe allergic reaction may occur.   Most of the time, all you need to do for a tick bite is relieve any symptoms you may have. Follow-up care is a key part of your treatment and safety. Be sure to make and go to all appointments, and call your doctor if you are having problems. It's also a good idea to know your test results and keep a list of the medicines you take. How can you care for yourself at home? Put ice or a cold pack on the bite for 15 to 20 minutes once an hour. Put a thin cloth between the ice and your skin. Try an over-the-counter medicine to relieve itching, redness, swelling, and pain. Be safe with medicines. Read and follow all instructions on the label. Take an antihistamine medicine, such as a nondrowsy one like loratadine (Claritin) or one that might make you sleepy like diphenhydramine (Benadryl). These medicines may help relieve itching, redness, and swelling. Use a spray of local anesthetic that contains benzocaine, such as Solarcaine. It may help relieve pain. If your skin reacts to the spray, stop using it. Put calamine lotion on the skin. It may help relieve itching. To avoid tick bites  Avoid ticks:  Learn where ticks are found in your community, and stay away from those areas if possible. Cover as much of your body as possible when you work or play in grassy or wooded areas. Use insect repellents, such as products containing DEET. You can spray them on your skin. Take steps to control ticks on your property if you live in an area where Lyme disease occurs. Clear leaves, brush, tall grasses, woodpiles, and stone fences from around your house and the edges of your yard or garden. This may help get rid of ticks. When you come in from outdoors, check your body for ticks, including your groin, head, and underarms. The ticks may be about the size of a sesame seed. If no one else can help you check for ticks on your scalp, comb your hair with a fine-tooth comb. If you find a tick, remove it quickly.  Use tweezers to grasp the tick

## 2019-06-15 LAB — LYME, EIA: 0.22 LIV (ref 0–1.2)

## 2019-06-16 LAB
ROCKY MOUNTAIN SPOTTED FEVER AB IGM,: NORMAL
ROCKY MOUNTAIN SPOTTED FEVER ANTIBODY IGG: NORMAL

## 2019-06-16 ASSESSMENT — ENCOUNTER SYMPTOMS: ABDOMINAL PAIN: 1

## 2019-06-20 ENCOUNTER — TELEPHONE (OUTPATIENT)
Dept: FAMILY MEDICINE CLINIC | Age: 39
End: 2019-06-20

## 2019-06-20 NOTE — TELEPHONE ENCOUNTER
Franky Brady would like a call to discuss her Labs results.  her preferred call back time is  Anytime

## 2019-10-29 DIAGNOSIS — J01.90 ACUTE BACTERIAL SINUSITIS: Primary | ICD-10-CM

## 2019-10-29 DIAGNOSIS — B96.89 ACUTE BACTERIAL SINUSITIS: Primary | ICD-10-CM

## 2019-10-29 RX ORDER — FLUTICASONE PROPIONATE 50 MCG
1 SPRAY, SUSPENSION (ML) NASAL DAILY
Qty: 1 BOTTLE | Refills: 2 | Status: SHIPPED | OUTPATIENT
Start: 2019-10-29

## 2019-10-29 RX ORDER — METHYLPREDNISOLONE 4 MG/1
TABLET ORAL
Qty: 1 KIT | Refills: 0 | Status: SHIPPED | OUTPATIENT
Start: 2019-10-29 | End: 2019-11-04

## 2019-10-29 RX ORDER — AZITHROMYCIN 250 MG/1
250 TABLET, FILM COATED ORAL SEE ADMIN INSTRUCTIONS
Qty: 6 TABLET | Refills: 0 | Status: SHIPPED | OUTPATIENT
Start: 2019-10-29 | End: 2019-11-03

## 2022-09-01 ENCOUNTER — OFFICE VISIT (OUTPATIENT)
Dept: FAMILY MEDICINE CLINIC | Age: 42
End: 2022-09-01
Payer: MEDICAID

## 2022-09-01 VITALS
OXYGEN SATURATION: 98 % | HEART RATE: 80 BPM | DIASTOLIC BLOOD PRESSURE: 76 MMHG | SYSTOLIC BLOOD PRESSURE: 120 MMHG | WEIGHT: 178 LBS | RESPIRATION RATE: 12 BRPM | BODY MASS INDEX: 29.66 KG/M2 | HEIGHT: 65 IN | TEMPERATURE: 98 F

## 2022-09-01 DIAGNOSIS — S05.01XA ABRASION OF RIGHT CORNEA, INITIAL ENCOUNTER: Primary | ICD-10-CM

## 2022-09-01 DIAGNOSIS — R09.81 HEAD CONGESTION: ICD-10-CM

## 2022-09-01 DIAGNOSIS — J30.2 SEASONAL ALLERGIES: ICD-10-CM

## 2022-09-01 PROCEDURE — 99213 OFFICE O/P EST LOW 20 MIN: CPT | Performed by: FAMILY MEDICINE

## 2022-09-01 PROCEDURE — 87635 SARS-COV-2 COVID-19 AMP PRB: CPT | Performed by: FAMILY MEDICINE

## 2022-09-01 RX ORDER — CETIRIZINE HYDROCHLORIDE 10 MG/1
10 TABLET ORAL NIGHTLY
Qty: 30 TABLET | Refills: 0 | Status: SHIPPED | OUTPATIENT
Start: 2022-09-01 | End: 2022-10-01

## 2022-09-01 RX ORDER — ERYTHROMYCIN 5 MG/G
OINTMENT OPHTHALMIC
Qty: 3.5 G | Refills: 0 | Status: SHIPPED | OUTPATIENT
Start: 2022-09-01 | End: 2022-09-11

## 2022-09-01 SDOH — ECONOMIC STABILITY: FOOD INSECURITY: WITHIN THE PAST 12 MONTHS, YOU WORRIED THAT YOUR FOOD WOULD RUN OUT BEFORE YOU GOT MONEY TO BUY MORE.: NEVER TRUE

## 2022-09-01 SDOH — ECONOMIC STABILITY: FOOD INSECURITY: WITHIN THE PAST 12 MONTHS, THE FOOD YOU BOUGHT JUST DIDN'T LAST AND YOU DIDN'T HAVE MONEY TO GET MORE.: NEVER TRUE

## 2022-09-01 ASSESSMENT — PATIENT HEALTH QUESTIONNAIRE - PHQ9
SUM OF ALL RESPONSES TO PHQ QUESTIONS 1-9: 0
2. FEELING DOWN, DEPRESSED OR HOPELESS: 0
SUM OF ALL RESPONSES TO PHQ QUESTIONS 1-9: 0
1. LITTLE INTEREST OR PLEASURE IN DOING THINGS: 0
SUM OF ALL RESPONSES TO PHQ QUESTIONS 1-9: 0
SUM OF ALL RESPONSES TO PHQ QUESTIONS 1-9: 0
SUM OF ALL RESPONSES TO PHQ9 QUESTIONS 1 & 2: 0

## 2022-09-01 ASSESSMENT — SOCIAL DETERMINANTS OF HEALTH (SDOH): HOW HARD IS IT FOR YOU TO PAY FOR THE VERY BASICS LIKE FOOD, HOUSING, MEDICAL CARE, AND HEATING?: NOT VERY HARD

## 2022-09-01 NOTE — PROGRESS NOTES
Chief Complaint   Patient presents with    Eye Problem    Otalgia       Have you seen any other physician or provider since your last visit no    Have you had any other diagnostic tests since your last visit? no    Have you changed or stopped any medications since your last visit? no     I have recommended that this patient have a sigmoidoscopy but she due to refusal reason: no symptoms  I have discussed the risks and benefits of this examination with her. The patient verbalizes understanding. Provider will be informed of refusal.      Diabetic retinal exam completed this year?  No                       * If yes please have patient sign a records release to obtain record to update Health Maintenance                       * If no, please order referral for patient to be scheduled     Patient complains of pain to eyes and swelling also complains of earache

## 2022-09-01 NOTE — PROGRESS NOTES
SUBJECTIVE:    Patient ID: Toya العراقي is a 37 y.o. female. Chief Complaint   Patient presents with    Eye Problem    Otalgia       HPI: Office visit  She is in the office today after the last couple days having been working in her barn. She says that her right eye is swollen irritated and draining. Its mostly water but she says in the morning when she wakes up it is thick and crusty. It is stuck together. She says it is really painful. She is not known of anything getting in her eye. She does not remember any injury. She says she thought at first it was just allergies from being in the barn but it is getting worse. She says that she is struggling. She can hardly sleep due to the pain. She has not had any resolution with any of the over-the-counter drops that she is used. Review of Systems   All other systems reviewed and are negative. OBJECTIVE:  /76   Pulse 80   Temp 98 °F (36.7 °C) (Temporal)   Resp 12   Ht 5' 5\" (1.651 m)   Wt 178 lb (80.7 kg)   SpO2 98%   BMI 29.62 kg/m²    Wt Readings from Last 3 Encounters:   09/01/22 178 lb (80.7 kg)   06/12/19 178 lb (80.7 kg)   12/11/18 175 lb (79.4 kg)     BP Readings from Last 3 Encounters:   09/01/22 120/76   06/12/19 130/70   12/11/18 138/89      Pulse Readings from Last 3 Encounters:   09/01/22 80   06/12/19 95   12/11/18 78     Body mass index is 29.62 kg/m². Resp Readings from Last 3 Encounters:   09/01/22 12   06/12/19 18   12/11/18 20     Past medical, surgical, family and social history were reviewed and updated with the patient. Physical Exam  Constitutional:       Appearance: Normal appearance. She is normal weight. HENT:      Head: Normocephalic and atraumatic. Right Ear: External ear normal.      Left Ear: External ear normal.      Nose: Congestion present. Eyes:      General: Lids are normal. Lids are everted, no foreign bodies appreciated. Vision grossly intact. Gaze aligned appropriately. Right eye: Discharge present. No foreign body or hordeolum. Left eye: No foreign body, discharge or hordeolum. Extraocular Movements: Extraocular movements intact. Conjunctiva/sclera:      Right eye: Right conjunctiva is injected. No exudate. Comments: Excessive watering of the right eye. Neurological:      Mental Status: She is alert. No results found for requested labs within last 30 days. Microscopic Examination (no units)   Date Value   2017 YES     LDL Calculated (mg/dL)   Date Value   2017 106       Lab Results   Component Value Date/Time    WBC 7.6 10/16/2018 02:04 PM    NEUTROABS 5.0 10/16/2018 02:04 PM    HGB 14.7 10/16/2018 02:04 PM    HCT 44.1 10/16/2018 02:04 PM    MCV 92.1 10/16/2018 02:04 PM     10/16/2018 02:04 PM     Lab Results   Component Value Date    TSH 1.97 2017       ASSESSMENT/PLAN    Diagnosis Orders   1. Abrasion of right cornea, initial encounter  erythromycin (ROMYCIN) 5 MG/GM ophthalmic ointment   Right eye patched after administration of 1/4 inch ribbon of gentamicin ointment. Advised her to  her prescriptions as soon as possible. Her daughter was with her. Did discuss with both of them how to apply the ointment in the lower lid. And advised her to keep it covered for at least 24 if not 48 hours to help just with the discomfort. Of asked her that she could take Tylenol Motrin or any over-the-counter medicines for that.    2. Head congestion  POCT COVID-19 Rapid, NAAT    cetirizine (ZYRTEC) 10 MG tablet          Orders Placed This Encounter   Medications    cetirizine (ZYRTEC) 10 MG tablet     Sig: Take 1 tablet by mouth at bedtime     Dispense:  30 tablet     Refill:  0    erythromycin (ROMYCIN) 5 MG/GM ophthalmic ointment     Si/2 inch ribbon to the right eye tid for 5 days     Dispense:  3.5 g     Refill:  0        Medications Discontinued During This Encounter   Medication Reason    b complex vitamins capsule LIST CLEANUP    dicyclomine (BENTYL) 10 MG capsule LIST CLEANUP    dicyclomine (BENTYL) 20 MG tablet LIST CLEANUP    meloxicam (MOBIC) 15 MG tablet LIST CLEANUP    methocarbamol (ROBAXIN) 750 MG tablet LIST CLEANUP    ondansetron (ZOFRAN ODT) 4 MG disintegrating tablet LIST CLEANUP    ondansetron (ZOFRAN) 4 MG tablet LIST CLEANUP    ondansetron (ZOFRAN-ODT) 8 MG TBDP disintegrating tablet LIST CLEANUP    vitamin D (ERGOCALCIFEROL) 28327 units CAPS capsule LIST CLEANUP       Controlled Substances Monitoring:      Please note: This chart was generated using Dragon dictation software. Although every effort was made to ensure the accuracy of this automated transcription, some errors in transcription may have occurred.

## 2022-09-05 ASSESSMENT — VISUAL ACUITY: OU: 1

## 2022-12-16 ENCOUNTER — CARE COORDINATION (OUTPATIENT)
Dept: CARE COORDINATION | Age: 42
End: 2022-12-16

## 2022-12-16 NOTE — CARE COORDINATION
Care Transitions Initial Follow Up Call    Call within 2 business days of discharge: Yes     Patient: Jorge Price Patient : 1980 MRN: [id number]    Last Discharge  Street       Date Complaint Diagnosis Description Type Department Provider    17 Abdominal Pain Cholecystitis ED (TRANSFER) [facility] ED Loman Rinne, MD            RARS: No data recorded     Spoke with: Attempting HFU call, unsuccessful. Unable to leave message, no voicemail.      Discharge department/facility: [address] services provided:  Scheduled appointment with PCP-Reed/ Elly  Obtained and reviewed discharge summary and/or continuity of care documents    Follow Up  Future Appointments   Date Time Provider Julia Jones   2022  1:30 PM Le Rico, APRN - CNP [id number] [place]       Debbie Marrufo RN

## 2022-12-19 NOTE — CARE COORDINATION
Care Transitions Initial Follow Up Call    Call within 2 business days of discharge: Yes     Patient: Seretha Kanner Patient : 1980 MRN: [id number]    Last Discharge  Street       Date Complaint Diagnosis Description Type Department Provider    17 Abdominal Pain Cholecystitis ED (TRANSFER) [facility] ED Sherry Weinberg MD            RARS: No data recorded     Spoke with: Attempting HFU call, unsuccessful. Message left on husbands phone with contact information.      Discharge department/facility: [facility]    Non-face-to-face services provided:  Scheduled appointment with PCPChris  Obtained and reviewed discharge summary and/or continuity of care documents    Follow Up  Future Appointments   Date Time Provider Julia Jones   2022  1:30 PM Gerri Starkey APRN - CNP [id number]       Bev Grace RN

## 2022-12-20 ENCOUNTER — OFFICE VISIT (OUTPATIENT)
Dept: FAMILY MEDICINE CLINIC | Age: 42
End: 2022-12-20
Payer: MEDICAID

## 2022-12-20 ENCOUNTER — HOSPITAL ENCOUNTER (OUTPATIENT)
Facility: HOSPITAL | Age: 42
Discharge: HOME | End: 2022-12-20
Payer: MEDICAID

## 2022-12-20 VITALS
RESPIRATION RATE: 18 BRPM | DIASTOLIC BLOOD PRESSURE: 80 MMHG | BODY MASS INDEX: 30.26 KG/M2 | WEIGHT: 181.6 LBS | HEIGHT: 65 IN | OXYGEN SATURATION: 99 % | HEART RATE: 75 BPM | SYSTOLIC BLOOD PRESSURE: 126 MMHG | TEMPERATURE: 98.4 F

## 2022-12-20 DIAGNOSIS — F41.9 ANXIETY: ICD-10-CM

## 2022-12-20 DIAGNOSIS — Z87.828 HX OF HEAT STROKE: ICD-10-CM

## 2022-12-20 DIAGNOSIS — Z09 HOSPITAL DISCHARGE FOLLOW-UP: ICD-10-CM

## 2022-12-20 DIAGNOSIS — I70.1 LEFT RENAL ARTERY STENOSIS (HCC): ICD-10-CM

## 2022-12-20 DIAGNOSIS — M79.601 RIGHT ARM PAIN: ICD-10-CM

## 2022-12-20 DIAGNOSIS — Z86.73 HISTORY OF RECENT STROKE: Primary | ICD-10-CM

## 2022-12-20 PROCEDURE — 99214 OFFICE O/P EST MOD 30 MIN: CPT | Performed by: NURSE PRACTITIONER

## 2022-12-20 PROCEDURE — 85610 PROTHROMBIN TIME: CPT

## 2022-12-20 PROCEDURE — 36415 COLL VENOUS BLD VENIPUNCTURE: CPT

## 2022-12-20 PROCEDURE — 1111F DSCHRG MED/CURRENT MED MERGE: CPT | Performed by: NURSE PRACTITIONER

## 2022-12-20 RX ORDER — ENOXAPARIN SODIUM 100 MG/ML
INJECTION SUBCUTANEOUS
COMMUNITY
Start: 2022-12-15

## 2022-12-20 RX ORDER — ASPIRIN 81 MG/1
TABLET, CHEWABLE ORAL
COMMUNITY
Start: 2022-12-15

## 2022-12-20 RX ORDER — WARFARIN SODIUM 1 MG/1
TABLET ORAL
COMMUNITY
Start: 2022-12-19

## 2022-12-20 RX ORDER — POLYETHYLENE GLYCOL 3350 17 G/17G
POWDER, FOR SOLUTION ORAL
COMMUNITY
Start: 2022-12-15

## 2022-12-20 RX ORDER — HYDROCODONE BITARTRATE AND ACETAMINOPHEN 5; 325 MG/1; MG/1
1 TABLET ORAL EVERY 6 HOURS PRN
COMMUNITY

## 2022-12-20 RX ORDER — WARFARIN SODIUM 5 MG/1
TABLET ORAL
COMMUNITY
Start: 2022-12-16

## 2022-12-20 RX ORDER — LIDOCAINE 4 G/100G
PATCH TOPICAL
COMMUNITY
Start: 2022-12-19

## 2022-12-20 RX ORDER — ONDANSETRON 4 MG/1
TABLET, ORALLY DISINTEGRATING ORAL
COMMUNITY
Start: 2022-12-15

## 2022-12-20 RX ORDER — BISACODYL 5 MG
TABLET, DELAYED RELEASE (ENTERIC COATED) ORAL
COMMUNITY
Start: 2022-12-15

## 2022-12-20 RX ORDER — DOCUSATE SODIUM 50 MG AND SENNOSIDES 8.6 MG 8.6; 5 MG/1; MG/1
TABLET, FILM COATED ORAL
COMMUNITY
Start: 2022-12-15

## 2022-12-20 RX ORDER — ACETAMINOPHEN 325 MG/1
TABLET ORAL
COMMUNITY
Start: 2022-12-15

## 2022-12-20 RX ORDER — NIFEDIPINE 90 MG/1
TABLET, EXTENDED RELEASE ORAL
COMMUNITY
Start: 2022-12-15

## 2022-12-20 RX ORDER — HYDROCODONE BITARTRATE AND ACETAMINOPHEN 5; 325 MG/1; MG/1
1 TABLET ORAL EVERY 6 HOURS PRN
Qty: 28 TABLET | Refills: 0 | Status: SHIPPED | OUTPATIENT
Start: 2022-12-20 | End: 2022-12-21 | Stop reason: SDUPTHER

## 2022-12-20 RX ORDER — HYDRALAZINE HYDROCHLORIDE 50 MG/1
TABLET, FILM COATED ORAL
COMMUNITY
Start: 2022-12-19

## 2022-12-20 NOTE — PROGRESS NOTES
Post-Discharge Transitional Care Management Progress Note      Raissa Ayala   YOB: 1980    Date of Office Visit:  12/20/2022  Date of Hospital Admission: 12-3-22 went to [facility] and was flown to [facility] and admitted then discharged and sent to [facility] on 12-10-22  Date of Hospital Discharge: 12-19-22    Care management risk score Rising risk (score 2-5) and Complex Care (Scores >=6): No Risk Score On File     Non face to face  following discharge, date last encounter closed (first attempt may have been earlier): 12/16/2022 12/16/2022    Call initiated 2 business days of discharge: Yes    ASSESSMENT/PLAN:   History of recent stroke  -     Protime-INR; Future  -     Protime-INR; Future  -     Protime-INR; Future  -     Protime-INR; Future  -     Protime-INR; Future  -     Protime-INR; Future  -     Protime-INR; Future  Right arm pain  -     HYDROcodone-acetaminophen (NORCO) 5-325 MG per tablet; Take 1 tablet by mouth every 6 hours as needed for Pain for up to 7 days. Intended supply: 7 days. Take lowest dose possible to manage pain, Disp-28 tablet, R-0Normal  Hospital discharge follow-up  -     [facility] DISCHARGE MEDS RECONCILED W/ CURRENT OUTPATIENT MED LIST  Left renal artery stenosis (HCC)  -     External Referral To Nephrology  Anxiety  -     hydrOXYzine HCl (ATARAX) 25 MG tablet; Take 1 tablet by mouth every 8 hours as needed for Anxiety, Disp-30 tablet, R-0Normal    Medical Decision Making: moderate complexity  Return in 1 week (on 12/27/2022). On this date 12/20/2022 I have spent 30 minutes reviewing previous notes, test results and face to face with the patient discussing the diagnosis and importance of compliance with the treatment plan as well as documenting on the day of the visit. Subjective:   HPI:  Follow up of Hospital problems/diagnosis(es): Right thalamic infarct, HTN  Patient presents with her  today.  She admits she had a terrible migraine on 12-2-22 and thought it would resolve so she laid down. Symptoms worsened and she began to have left side numbness of her entire body and they took her to [facility] on 12-3-22 where they flew her to [facility]. She was admitted on 12-3-22 and worked up where they discovered she had a thalamic infarct and considered a thrombectomy but cancelled it due to risk. 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 Neurosurgery was consulted for possible daniels daniels but ruled out. She does ha roman  follow up with Dr. Darcie Alcaraz in 6 weeks. She was and placed in neuro ICU. She was then sent to [facility] on 12-10-22. She was discharged from [facility] on 12-16-22 and was told to be cleared to drive by her physician. She has a follow up with the stroke clinic in 6-12 weeks (says she has appt but couldn't recall the exact date). She did have an elevated lupus anticoagulant. She was placed on warfarin 6 mg daily and given Lovenox to bridge her if her INR become elevated. She is to have daily INR's drawn until stable and an INR machine was ordered for her to check it at home. She was also placed on daily aspirin therapy although she thought she was allergic since childhood, that was challenged and she tolerated fine. Her blood pressure was managed upon discharge with hydralazine 50 mg TID, metoprolol 25 mg daily and nifedipine 90 mg daily. She is checking her BP at home and says it is staying WNL unless her pain is increased. She is having a lot of right arm pain/swelling. She is wearing a sling in office today. She says the pain began after her angiogram and was assessed by multiple people in the hospital and told it was fine that it was a common issue after the angiogram and would get better. She says the pain is very bad and she is requesting additional pain medication today.   She says she was seen at the ER at [facility] last night due to the pain and she was told it wasn't a blood clot and the pain and swelling was normal.  says they also checked her INR and it was 3.2. She also states she has a history of strokes in her family, two paternal aunts, her father and her paternal uncle all suffered from strokes at an early age. She feels very anxious since all this has happened and is struggling to sleep. I asked her about referral to behavioral health for therapy and she says not right now since she just got home from hospital but says it may be a good idea if she has that in the future. Inpatient course: Discharge summary reviewed- see chart. Interval history/Current status: stable    Patient Active Problem List   Diagnosis    HTN (hypertension)    Insomnia    Anxiety       Medications listed as ordered at the time of discharge from hospital     Medication List            Accurate as of December 20, 2022 11:59 PM. If you have any questions, ask your nurse or doctor. START taking these medications      hydrOXYzine HCl 25 MG tablet  Commonly known as: ATARAX  Take 1 tablet by mouth every 8 hours as needed for Anxiety  Started by: BETY Gama CNP            CHANGE how you take these medications      * HYDROcodone-acetaminophen 5-325 MG per tablet  Commonly known as: [address]  What changed: Another medication with the same name was added. Make sure you understand how and when to take each. Changed by: BETY Gama CNP     * HYDROcodone-acetaminophen 5-325 MG per tablet  Commonly known as: Norco  Take 1 tablet by mouth every 6 hours as needed for Pain for up to 7 days. Intended supply: 7 days. Take lowest dose possible to manage pain  What changed: You were already taking a medication with the same name, and this prescription was added. Make sure you understand how and when to take each. Changed by: BETY Gama CNP           * This list has 2 medication(s) that are the same as other medications prescribed for you.  Read the directions carefully, and ask your doctor or other care provider to review them with you. CONTINUE taking these medications      acetaminophen 325 MG tablet  Commonly known as: TYLENOL     aspirin 81 MG chewable tablet     bisacodyl 5 MG EC tablet  Generic drug: bisacodyl     diclofenac sodium 1 % Gel  Commonly known as: VOLTAREN     enoxaparin 80 MG/0.8ML  Commonly known as: LOVENOX     EPINEPHrine 0.3 MG/0.3ML Soaj injection  Commonly known as: EpiPen 2-Paul  Use as directed for allergic reaction     hydrALAZINE 50 MG tablet  Commonly known as: APRESOLINE     Lidocaine Pain Relief 4 % external patch  Generic drug: lidocaine     metoprolol tartrate 25 MG tablet  Commonly known as: LOPRESSOR     NIFEdipine 90 MG extended release tablet  Commonly known as: PROCARDIA XL     ondansetron 4 MG disintegrating tablet  Commonly known as: ZOFRAN-ODT     polyethylene glycol 17 GM/SCOOP powder  Commonly known as: GLYCOLAX     Stimulant Laxative 8.6-50 MG per tablet  Generic drug: senna-docusate     * warfarin 5 MG tablet  Commonly known as: COUMADIN     * warfarin 1 MG tablet  Commonly known as: COUMADIN           * This list has 2 medication(s) that are the same as other medications prescribed for you. Read the directions carefully, and ask your doctor or other care provider to review them with you.                 STOP taking these medications      fluticasone 50 MCG/ACT nasal spray  Commonly known as: FLONASE  Stopped by: BETY Arceo CNP               Where to Get Your Medications        These medications were sent to [facility] [id number] - [address], [facility] [phone]  [address] [place] [address], [address] [place] [address]      Phone: [phone]   HYDROcodone-acetaminophen 5-325 MG per tablet  hydrOXYzine HCl 25 MG tablet           Medications marked \"taking\" at this time  Outpatient Medications Marked as Taking for the 12/20/22 encounter (Office Visit) with BETY Arceo CNP   Medication Sig Dispense Refill    hydrOXYzine HCl (ATARAX) 25 MG tablet Take 1 tablet by mouth every 8 hours as needed for Anxiety 30 tablet 0    acetaminophen (TYLENOL) 325 MG tablet       aspirin 81 MG chewable tablet       BISACODYL 5 MG EC tablet       enoxaparin (LOVENOX) 80 MG/0.8ML       hydrALAZINE (APRESOLINE) 50 MG tablet       LIDOCAINE PAIN RELIEF 4 % external patch       metoprolol tartrate (LOPRESSOR) 25 MG tablet       NIFEdipine (PROCARDIA XL) 90 MG extended release tablet       ondansetron (ZOFRAN-ODT) 4 MG disintegrating tablet       polyethylene glycol (GLYCOLAX) 17 GM/SCOOP powder       STIMULANT LAXATIVE 8.6-50 MG per tablet       warfarin (COUMADIN) 5 MG tablet       warfarin (COUMADIN) 1 MG tablet       diclofenac sodium (VOLTAREN) 1 % GEL Apply topically 2 times daily      HYDROcodone-acetaminophen (NORCO) 5-325 MG per tablet Take 1 tablet by mouth every 6 hours as needed for Pain. HYDROcodone-acetaminophen (NORCO) 5-325 MG per tablet Take 1 tablet by mouth every 6 hours as needed for Pain for up to 7 days. Intended supply: 7 days. Take lowest dose possible to manage pain 28 tablet 0    EPINEPHrine (EPIPEN 2-LUDWIG) 0.3 MG/0.3ML SOAJ injection Use as directed for allergic reaction 2 each 5        Medications patient taking as of now reconciled against medications ordered at time of hospital discharge: Yes    A comprehensive review of systems was negative except for what was noted in the HPI. Objective:    /80   Pulse 75   Temp 98.4 °F (36.9 °C) (Infrared)   Resp 18   Ht 5' 5\" (1.651 m)   Wt 181 lb 9.6 oz (82.4 kg)   SpO2 99% Comment: ra  BMI 30.22 kg/m²   Pt's right forearm was tender upon palpation with edema and ecchymosis present. Patient says pain is 10/10. An electronic signature was used to authenticate this note.   --BETY García - CNP

## 2022-12-20 NOTE — PROGRESS NOTES
No chief complaint on file. Have you seen any other physician or provider since your last visit yes - [facility] for rehab for stroke    Have you had any other diagnostic tests since your last visit?  yes -    Have you changed or stopped any medications since your last visit? no

## 2022-12-21 ENCOUNTER — HOSPITAL ENCOUNTER (OUTPATIENT)
Facility: HOSPITAL | Age: 42
Discharge: HOME | End: 2022-12-21
Payer: MEDICAID

## 2022-12-21 ENCOUNTER — OFFICE VISIT (OUTPATIENT)
Dept: FAMILY MEDICINE CLINIC | Age: 42
End: 2022-12-21
Payer: MEDICAID

## 2022-12-21 VITALS — BODY MASS INDEX: 30.31 KG/M2 | RESPIRATION RATE: 20 BRPM | HEIGHT: 65 IN | WEIGHT: 181.9 LBS

## 2022-12-21 DIAGNOSIS — Z86.73 HISTORY OF RECENT STROKE: ICD-10-CM

## 2022-12-21 DIAGNOSIS — M79.601 RIGHT ARM PAIN: ICD-10-CM

## 2022-12-21 DIAGNOSIS — M79.601 RIGHT ARM PAIN: Primary | ICD-10-CM

## 2022-12-21 LAB
INR BLD: 2.58 (ref 0.87–1.11)
INR BLD: 3.74 (ref 0.87–1.11)
PROTHROMBIN TIME: 27.7 SEC (ref 12–14.4)
PROTHROMBIN TIME: 37 SEC (ref 12–14.4)

## 2022-12-21 PROCEDURE — 99213 OFFICE O/P EST LOW 20 MIN: CPT | Performed by: NURSE PRACTITIONER

## 2022-12-21 PROCEDURE — 36415 COLL VENOUS BLD VENIPUNCTURE: CPT

## 2022-12-21 PROCEDURE — 85610 PROTHROMBIN TIME: CPT

## 2022-12-21 RX ORDER — HYDROXYZINE HYDROCHLORIDE 25 MG/1
25 TABLET, FILM COATED ORAL EVERY 8 HOURS PRN
Qty: 30 TABLET | Refills: 0 | Status: SHIPPED | OUTPATIENT
Start: 2022-12-21 | End: 2022-12-31

## 2022-12-21 RX ORDER — KETOROLAC TROMETHAMINE 30 MG/ML
60 INJECTION, SOLUTION INTRAMUSCULAR; INTRAVENOUS ONCE
Status: COMPLETED | OUTPATIENT
Start: 2022-12-21 | End: 2022-12-21

## 2022-12-21 RX ORDER — HYDROCODONE BITARTRATE AND ACETAMINOPHEN 5; 325 MG/1; MG/1
1 TABLET ORAL EVERY 6 HOURS PRN
Qty: 28 TABLET | Refills: 0 | Status: SHIPPED | OUTPATIENT
Start: 2022-12-21 | End: 2022-12-28

## 2022-12-21 RX ADMIN — KETOROLAC TROMETHAMINE 60 MG: 30 INJECTION, SOLUTION INTRAMUSCULAR; INTRAVENOUS at 14:17

## 2022-12-21 NOTE — PROGRESS NOTES
Ashley Mascorro 40 y.o. presents today for   Chief Complaint   Patient presents with    Arm Pain     From angiogram        HPI:  Ashley Mascorro states she is here today with increasing right arm pain. She is with her . She had an angiogram done on 22 using her ulnar artery because her radial artery was too small. She says the next day her arm began to swell and hurt and they told her that was normal. She was discharged on 22. The pain in her arm continued and worsened and on 22 she went to ER at [facility] who evaluated her arm and administered morphine and prescribed norco for pain. ER report showed contusions and edema from procedure but adequate circulation and little concern for DVT due to anticoagulant therapy she is currently on. Her PT INR was 3.3. She was seen by me yesterday in clinic for her hospital follow up and was complaining of pain 10/10 in her right arm. I sent additional pain medication to last until her follow up appt with Dr. Agnes North but she says last night she hardly slept because her arm hurts so much. She is still wearing a sling and says she can hardly move her arm due to pain.   Family History   Problem Relation Age of Onset    Arthritis Mother     Asthma Mother     High Blood Pressure Father     Stroke Father     Depression Maternal Aunt     Arthritis Maternal Grandmother     Depression Maternal Grandmother     Substance Abuse Maternal Grandmother     Asthma Sister     Mental Illness Paternal Grandfather         Social History     Socioeconomic History    Marital status:      Spouse name: Not on file    Number of children: Not on file    Years of education: Not on file    Highest education level: Not on file   Occupational History    Not on file   Tobacco Use    Smoking status: Former     Types: Cigarettes     Quit date: 2017     Years since quittin.9    Smokeless tobacco: Never   Substance and Sexual Activity    Alcohol use: No    Drug use: No    Sexual activity: Yes     Partners: Male   Other Topics Concern    Not on file   Social History Narrative    Not on file     Social Determinants of Health     Financial Resource Strain: Low Risk     Difficulty of Paying Living Expenses: Not very hard   Food Insecurity: No Food Insecurity    Worried About Running Out of Food in the Last Year: Never true    Ran Out of Food in the Last Year: Never true   Transportation Needs: Not on file   Physical Activity: Not on file   Stress: Not on file   Social Connections: Not on file   Intimate Partner Violence: Not on file   Housing Stability: Not on file        Past Surgical History:   Procedure Laterality Date    APPENDECTOMY       SECTION      NOSE SURGERY          Past Medical History:   Diagnosis Date    Anxiety     Depression     Hypertension         Current Outpatient Medications   Medication Sig Dispense Refill    hydrOXYzine HCl (ATARAX) 25 MG tablet Take 1 tablet by mouth every 8 hours as needed for Anxiety 30 tablet 0    acetaminophen (TYLENOL) 325 MG tablet       aspirin 81 MG chewable tablet       BISACODYL 5 MG EC tablet       enoxaparin (LOVENOX) 80 MG/0.8ML       hydrALAZINE (APRESOLINE) 50 MG tablet       LIDOCAINE PAIN RELIEF 4 % external patch       metoprolol tartrate (LOPRESSOR) 25 MG tablet       NIFEdipine (PROCARDIA XL) 90 MG extended release tablet       ondansetron (ZOFRAN-ODT) 4 MG disintegrating tablet       polyethylene glycol (GLYCOLAX) 17 GM/SCOOP powder       STIMULANT LAXATIVE 8.6-50 MG per tablet       warfarin (COUMADIN) 5 MG tablet       warfarin (COUMADIN) 1 MG tablet       diclofenac sodium (VOLTAREN) 1 % GEL Apply topically 2 times daily      HYDROcodone-acetaminophen (NORCO) 5-325 MG per tablet Take 1 tablet by mouth every 6 hours as needed for Pain. HYDROcodone-acetaminophen (NORCO) 5-325 MG per tablet Take 1 tablet by mouth every 6 hours as needed for Pain for up to 7 days. Intended supply: 7 days.  Take lowest dose possible to manage pain 28 tablet 0    EPINEPHrine (EPIPEN 2-LUDWIG) 0.3 MG/0.3ML SOAJ injection Use as directed for allergic reaction 2 each 5     No current facility-administered medications for this visit. Review of Systems   Musculoskeletal:         Right arm pain from elbow to fingers   All other systems reviewed and are negative. Resp 20   Ht 5' 5\" (1.651 m)   Wt 181 lb 14.4 oz (82.5 kg)   BMI 30.27 kg/m²      Physical Exam  Constitutional:       Appearance: Normal appearance. HENT:      Head: Normocephalic and atraumatic. Right Ear: Ear canal normal.      Left Ear: External ear normal.      Nose: Nose normal.      Mouth/Throat:      Mouth: Mucous membranes are moist.      Pharynx: Oropharynx is clear. Eyes:      Extraocular Movements: Extraocular movements intact. Conjunctiva/sclera: Conjunctivae normal.      Pupils: Pupils are equal, round, and reactive to light. Cardiovascular:      Rate and Rhythm: Normal rate and regular rhythm. Pulses: Normal pulses. Heart sounds: Normal heart sounds. Pulmonary:      Effort: Pulmonary effort is normal.      Breath sounds: Normal breath sounds. Abdominal:      General: Bowel sounds are normal.      Palpations: Abdomen is soft. Musculoskeletal:         General: Normal range of motion. Right forearm: Edema and tenderness present. Arms:       Cervical back: Normal range of motion and neck supple. Comments: Bruising with edema, no erythema, pt has good circulation and cap refill. No warmth or signs of infection. She does have good ROM. Skin:     General: Skin is warm and dry. Capillary Refill: Capillary refill takes less than 2 seconds. Neurological:      General: No focal deficit present. Mental Status: She is alert and oriented to person, place, and time. Psychiatric:         Mood and Affect: Mood normal.         Behavior: Behavior normal.        ASSESSMENT/PLAN    1.  Right arm pain  After careful assessment advised pt there was no sign of infection or compartment syndrome. Encouraged her to remove sling every two hours and stretch her arm and shoulder to avoid frozen shoulder/elbow. Wrapped pt's arm loosely with ace wrap which she says helped immediately. Encouraged her to remove wrap every 6 hours to assess her arm and ensure swelling and bruising is resolving and no erythema develops. She should also get a stress ball and squeeze it with her right hand every hour to promote circulation and healing. Pt asked for \"pain shot\" so we administered Toradol in clinic today. She should take her pain medication as needed. She may take two 5 mg tablets of norco before bed to help manage the pain so she can rest. Ordered her Pt INR blood work and printed order forms for her to go to outside facility daily for labs since our clinic will be closed from 12-23-22 to 12-26-22 also copied Dr. Emre Boyer to receive lab results as well. Ensured pt understood what therapeutic results were which she stated between 2.0 and 3.0 and ensured they were knowledgeable and comfortable administering Lovenox if necessary and they both were. . She should keep appt with Dr. Emre Boyer on 12-28-22.  She is agreeable to poc.   - ketorolac (TORADOL) injection 60 mg  - Apply ace wrap             BETY Vidales - CNP

## 2022-12-21 NOTE — PROGRESS NOTES
Chief Complaint   Patient presents with    Arm Pain       Have you seen any other physician or provider since your last visit yes [facility] ER    Have you had any other diagnostic tests since your last visit? no    Have you changed or stopped any medications since your last visit? no     Administrations This Visit       ketorolac (TORADOL) injection 60 mg       Admin Date  12/21/2022  14:17 Action  Given Dose  60 mg Route  IntraMUSCular Site  Dorsogluteal Left Administered By  Aaliyah Dang MA    Ordering Provider: BETY Gama CNP    NDC: 14010-674-29    Lot#: [id number]    : [employer]    Patient Supplied?: No                  Patient tolerated injection well. Patient advised to wait 20 minutes in the office following the injection. No signs/symptoms of reaction noted after 20 minutes.

## 2022-12-22 DIAGNOSIS — I82.601 THROMBOSIS OF RIGHT UPPER EXTREMITY: Primary | ICD-10-CM

## 2022-12-28 ENCOUNTER — OFFICE VISIT (OUTPATIENT)
Dept: FAMILY MEDICINE CLINIC | Age: 42
End: 2022-12-28
Payer: MEDICAID

## 2022-12-28 VITALS
SYSTOLIC BLOOD PRESSURE: 134 MMHG | BODY MASS INDEX: 30.99 KG/M2 | HEIGHT: 65 IN | RESPIRATION RATE: 18 BRPM | OXYGEN SATURATION: 98 % | DIASTOLIC BLOOD PRESSURE: 74 MMHG | TEMPERATURE: 97.4 F | HEART RATE: 81 BPM | WEIGHT: 186 LBS

## 2022-12-28 DIAGNOSIS — M79.601 RIGHT ARM PAIN: Primary | ICD-10-CM

## 2022-12-28 DIAGNOSIS — I10 PRIMARY HYPERTENSION: ICD-10-CM

## 2022-12-28 DIAGNOSIS — Z86.73 HISTORY OF RECENT STROKE: ICD-10-CM

## 2022-12-28 PROCEDURE — 3078F DIAST BP <80 MM HG: CPT | Performed by: FAMILY MEDICINE

## 2022-12-28 PROCEDURE — 3074F SYST BP LT 130 MM HG: CPT | Performed by: FAMILY MEDICINE

## 2022-12-28 PROCEDURE — 99213 OFFICE O/P EST LOW 20 MIN: CPT | Performed by: FAMILY MEDICINE

## 2022-12-28 RX ORDER — OXYCODONE HYDROCHLORIDE 5 MG/1
TABLET ORAL
COMMUNITY
Start: 2022-12-24

## 2022-12-28 RX ORDER — GABAPENTIN 100 MG/1
CAPSULE ORAL
COMMUNITY
Start: 2022-12-24

## 2022-12-28 NOTE — PROGRESS NOTES
Chief Complaint   Patient presents with    Arm Pain     Surgery        Have you seen any other physician or provider since your last visit yes - [facility]    Have you had any other diagnostic tests since your last visit?  yes - ultrasound    Have you changed or stopped any medications since your last visit? no

## 2022-12-28 NOTE — PROGRESS NOTES
SUBJECTIVE:    Patient ID: Jermaine Wong is a 40 y.o. female. Chief Complaint   Patient presents with    Arm Pain     Surgery        HPI: office visit  Is doing quite a bit better. She has had surgery on her arm to relieve the hematoma that was causing her so much pain in her right wrist.  She is slowly but surely getting better with that. She is moving her fingers better than she was. She is not having as much discomfort at all. She is taking the gabapentin that she feels like it is making her pretty loopy so she is wondering if she could discontinue that or just go to bedtime. She is still held on her anticoagulant because of the fact that she just had this surgery. She is at home now. Her blood pressures are doing pretty well. She is not having any chest pain. She has not had any new focal deficits. She did have a stroke [place]. They figured out she has a factor V Leiden problem. Review of Systems   Musculoskeletal:         Right arm pain from elbow to fingers   All other systems reviewed and are negative. OBJECTIVE:  /74   Pulse 81   Temp 97.4 °F (36.3 °C)   Resp 18   Ht 5' 5\" (1.651 m)   Wt 186 lb (84.4 kg)   LMP 12/13/2022   SpO2 98% Comment: ra  BMI 30.95 kg/m²    Wt Readings from Last 3 Encounters:   12/28/22 186 lb (84.4 kg)   12/21/22 181 lb 14.4 oz (82.5 kg)   12/20/22 181 lb 9.6 oz (82.4 kg)     BP Readings from Last 3 Encounters:   12/28/22 134/74   12/20/22 126/80   09/01/22 120/76      Pulse Readings from Last 3 Encounters:   12/28/22 81   12/20/22 75   09/01/22 80     Body mass index is 30.95 kg/m². Resp Readings from Last 3 Encounters:   12/28/22 18   12/21/22 20   12/20/22 18     Past medical, surgical, family and social history were reviewed and updated with the patient. Physical Exam  Constitutional:       Appearance: Normal appearance. HENT:      Head: Normocephalic and atraumatic.       Right Ear: Ear canal normal.      Left Ear: External ear normal.      Nose: Nose normal.      Mouth/Throat:      Mouth: Mucous membranes are moist.      Pharynx: Oropharynx is clear. Eyes:      Extraocular Movements: Extraocular movements intact. Conjunctiva/sclera: Conjunctivae normal.      Pupils: Pupils are equal, round, and reactive to light. Cardiovascular:      Rate and Rhythm: Normal rate and regular rhythm. Pulses: Normal pulses. Heart sounds: Normal heart sounds. Pulmonary:      Effort: Pulmonary effort is normal.      Breath sounds: Normal breath sounds. Abdominal:      General: Bowel sounds are normal.      Palpations: Abdomen is soft. Musculoskeletal:         General: Normal range of motion. Right forearm: Edema and tenderness present. Arms:       Cervical back: Normal range of motion and neck supple. Comments: Bruising with edema, no erythema, pt has good circulation and cap refill. No warmth or signs of infection. She does have good ROM. Skin:     General: Skin is warm and dry. Capillary Refill: Capillary refill takes less than 2 seconds. Neurological:      General: No focal deficit present. Mental Status: She is alert and oriented to person, place, and time. Psychiatric:         Mood and Affect: Mood normal.         Behavior: Behavior normal.        No results found for requested labs within last 30 days. Microscopic Examination (no units)   Date Value   01/02/2017 YES     LDL Calculated (mg/dL)   Date Value   04/25/2017 106       Lab Results   Component Value Date/Time    WBC 7.6 10/16/2018 02:04 PM    NEUTROABS 5.0 10/16/2018 02:04 PM    HGB 14.7 10/16/2018 02:04 PM    HCT 44.1 10/16/2018 02:04 PM    MCV 92.1 10/16/2018 02:04 PM     10/16/2018 02:04 PM     Lab Results   Component Value Date    TSH 1.97 04/25/2017       ASSESSMENT/PLAN    Diagnosis Orders   1. Right arm pain        2. History of recent stroke        3.  Primary hypertension            Have encouraged her to continue taking the medication as needed. Take the gabapentin down to just bedtime. She can slowly convert over to just Tylenol for the pain. Have also encouraged her to put ice on that arm I think that will help. In a few days we will try to determine when she can start back on her anticoagulant. Medications Discontinued During This Encounter   Medication Reason    acetaminophen (TYLENOL) 325 MG tablet LIST CLEANUP    diclofenac sodium (VOLTAREN) 1 % GEL     enoxaparin (LOVENOX) 80 MG/0.8ML LIST CLEANUP    HYDROcodone-acetaminophen (NORCO) 5-325 MG per tablet LIST CLEANUP    LIDOCAINE PAIN RELIEF 4 % external patch LIST CLEANUP    metoprolol tartrate (LOPRESSOR) 25 MG tablet LIST CLEANUP    NIFEdipine (PROCARDIA XL) 90 MG extended release tablet LIST CLEANUP    warfarin (COUMADIN) 1 MG tablet LIST CLEANUP    warfarin (COUMADIN) 5 MG tablet LIST CLEANUP       Controlled Substances Monitoring:      Please note: This chart was generated using Dragon dictation software. Although every effort was made to ensure the accuracy of this automated transcription, some errors in transcription may have occurred.

## 2023-01-11 ENCOUNTER — OFFICE VISIT (OUTPATIENT)
Dept: FAMILY MEDICINE CLINIC | Age: 43
End: 2023-01-11
Payer: MEDICAID

## 2023-01-11 VITALS
BODY MASS INDEX: 30.42 KG/M2 | DIASTOLIC BLOOD PRESSURE: 78 MMHG | OXYGEN SATURATION: 99 % | HEART RATE: 74 BPM | TEMPERATURE: 97.7 F | WEIGHT: 182.6 LBS | HEIGHT: 65 IN | SYSTOLIC BLOOD PRESSURE: 130 MMHG | RESPIRATION RATE: 12 BRPM

## 2023-01-11 DIAGNOSIS — I82.601 THROMBOSIS OF RIGHT UPPER EXTREMITY: ICD-10-CM

## 2023-01-11 DIAGNOSIS — F41.9 ANXIETY: ICD-10-CM

## 2023-01-11 DIAGNOSIS — I10 HYPERTENSION, UNSPECIFIED TYPE: Primary | ICD-10-CM

## 2023-01-11 DIAGNOSIS — Z86.73 HISTORY OF RECENT STROKE: ICD-10-CM

## 2023-01-11 PROCEDURE — 3078F DIAST BP <80 MM HG: CPT | Performed by: FAMILY MEDICINE

## 2023-01-11 PROCEDURE — 3074F SYST BP LT 130 MM HG: CPT | Performed by: FAMILY MEDICINE

## 2023-01-11 PROCEDURE — 99214 OFFICE O/P EST MOD 30 MIN: CPT | Performed by: FAMILY MEDICINE

## 2023-01-11 RX ORDER — ATORVASTATIN CALCIUM 20 MG/1
20 TABLET, FILM COATED ORAL DAILY
Qty: 30 TABLET | Refills: 3 | Status: SHIPPED | OUTPATIENT
Start: 2023-01-11

## 2023-01-11 RX ORDER — HYDRALAZINE HYDROCHLORIDE 50 MG/1
50 TABLET, FILM COATED ORAL 3 TIMES DAILY
Qty: 90 TABLET | Refills: 2 | Status: SHIPPED | OUTPATIENT
Start: 2023-01-11

## 2023-01-11 RX ORDER — NIFEDIPINE 90 MG/1
90 TABLET, EXTENDED RELEASE ORAL DAILY
Qty: 30 TABLET | Refills: 5 | Status: SHIPPED | OUTPATIENT
Start: 2023-01-11

## 2023-01-11 ASSESSMENT — PATIENT HEALTH QUESTIONNAIRE - PHQ9
SUM OF ALL RESPONSES TO PHQ9 QUESTIONS 1 & 2: 0
SUM OF ALL RESPONSES TO PHQ QUESTIONS 1-9: 0
SUM OF ALL RESPONSES TO PHQ QUESTIONS 1-9: 0
1. LITTLE INTEREST OR PLEASURE IN DOING THINGS: 0
2. FEELING DOWN, DEPRESSED OR HOPELESS: 0
SUM OF ALL RESPONSES TO PHQ QUESTIONS 1-9: 0
SUM OF ALL RESPONSES TO PHQ QUESTIONS 1-9: 0

## 2023-01-11 NOTE — PROGRESS NOTES
Chief Complaint   Patient presents with    Hypertension     Reg f/u     Arm Pain     Rt       Have you seen any other physician or provider since your last visit yes - nephrologist and surgeon    Have you had any other diagnostic tests since your last visit? labs    Have you changed or stopped any medications since your last visit? no

## 2023-01-11 NOTE — PROGRESS NOTES
SUBJECTIVE:    Patient ID: Fred Deshpande is a 40 y.o. female. Chief Complaint   Patient presents with    Hypertension     Reg f/u     Arm Pain     Rt       HPI: office visit  She is back in the office after she has been back in the hospital for hematoma removal in that right hand. After that MRI. Of the brain she stated sustained a significant hematoma. The pain from that seems to be better. Her blood pressure seem to be doing better. She still not got real good use of that right hand from the hematoma particularly the fifth and fourth digits she has some difficulty straightening and bending those. She is still having some left-sided weakness from the stroke. She is been off of her blood thinners due to the hematoma. She has not had any other new focal findings. She denies any chest pain or shortness of breath. She is having somewhat worsening of anxiety and depressive symptoms due to all this situation. Review of Systems   Musculoskeletal:         Right arm pain from elbow to fingers   All other systems reviewed and are negative. OBJECTIVE:  /78   Pulse 74   Temp 97.7 °F (36.5 °C) (Infrared)   Resp 12   Ht 5' 5\" (1.651 m)   Wt 182 lb 9.6 oz (82.8 kg)   LMP 12/13/2022   SpO2 99% Comment: ra  BMI 30.39 kg/m²    Wt Readings from Last 3 Encounters:   01/11/23 182 lb 9.6 oz (82.8 kg)   12/28/22 186 lb (84.4 kg)   12/21/22 181 lb 14.4 oz (82.5 kg)     BP Readings from Last 3 Encounters:   01/11/23 130/78   12/28/22 134/74   12/20/22 126/80      Pulse Readings from Last 3 Encounters:   01/11/23 74   12/28/22 81   12/20/22 75     Body mass index is 30.39 kg/m². Resp Readings from Last 3 Encounters:   01/11/23 12   12/28/22 18   12/21/22 20     Past medical, surgical, family and social history were reviewed and updated with the patient. Physical Exam  Constitutional:       Appearance: Normal appearance. HENT:      Head: Normocephalic and atraumatic.       Right Ear: Ear canal normal.      Left Ear: External ear normal.      Nose: Nose normal.      Mouth/Throat:      Mouth: Mucous membranes are moist.      Pharynx: Oropharynx is clear. Eyes:      Extraocular Movements: Extraocular movements intact. Conjunctiva/sclera: Conjunctivae normal.      Pupils: Pupils are equal, round, and reactive to light. Cardiovascular:      Rate and Rhythm: Normal rate and regular rhythm. Pulses: Normal pulses. Heart sounds: Normal heart sounds. Pulmonary:      Effort: Pulmonary effort is normal.      Breath sounds: Normal breath sounds. Abdominal:      General: Bowel sounds are normal.      Palpations: Abdomen is soft. Musculoskeletal:         General: Normal range of motion. Right forearm: Edema and tenderness present. Arms:       Cervical back: Normal range of motion and neck supple. Comments: Bruising with edema, no erythema, pt has good circulation and cap refill. No warmth or signs of infection. She does have good ROM. Skin:     General: Skin is warm and dry. Capillary Refill: Capillary refill takes less than 2 seconds. Neurological:      General: No focal deficit present. Mental Status: She is alert and oriented to person, place, and time. Psychiatric:         Mood and Affect: Mood normal.         Behavior: Behavior normal.        No results found for requested labs within last 30 days. Microscopic Examination (no units)   Date Value   01/02/2017 YES     LDL Calculated (mg/dL)   Date Value   04/25/2017 106       Lab Results   Component Value Date/Time    WBC 7.6 10/16/2018 02:04 PM    NEUTROABS 5.0 10/16/2018 02:04 PM    HGB 14.7 10/16/2018 02:04 PM    HCT 44.1 10/16/2018 02:04 PM    MCV 92.1 10/16/2018 02:04 PM     10/16/2018 02:04 PM     Lab Results   Component Value Date    TSH 1.97 04/25/2017       ASSESSMENT/PLAN    Diagnosis Orders   1. Hypertension, unspecified type  Seems to be doing pretty well with her current regimen.   I have advised her to continue doing what she has been doing. She does have follow-up with a specialist at [facility]. 2. History of recent stroke has neurology follow-up scheduled. Been on Coumadin before. She does not want to resume that. She like to take something that once a day. She like to take something that does not have to have blood work. I decided to go ahead and start her on Xarelto 10 mg once a day. When she sees neurology back they can determine if she needs to change that. 3. Thrombosis of right upper extremity to be improved. 4. Anxiety obviously worsened by her current situation. Orders Placed This Encounter   Medications    NIFEdipine (PROCARDIA XL) 90 MG extended release tablet     Sig: Take 1 tablet by mouth daily     Dispense:  30 tablet     Refill:  5    hydrALAZINE (APRESOLINE) 50 MG tablet     Sig: Take 1 tablet by mouth 3 times daily     Dispense:  90 tablet     Refill:  2    atorvastatin (LIPITOR) 20 MG tablet     Sig: Take 1 tablet by mouth daily     Dispense:  30 tablet     Refill:  3        Medications Discontinued During This Encounter   Medication Reason    hydrALAZINE (APRESOLINE) 50 MG tablet REORDER       Controlled Substances Monitoring:      Please note: This chart was generated using Dragon dictation software. Although every effort was made to ensure the accuracy of this automated transcription, some errors in transcription may have occurred.

## 2023-01-13 RX ORDER — RIVAROXABAN 10 MG/1
10 TABLET, FILM COATED ORAL
Qty: 30 TABLET | Refills: 3 | Status: SHIPPED | OUTPATIENT
Start: 2023-01-13

## 2023-02-08 ENCOUNTER — OFFICE VISIT (OUTPATIENT)
Dept: FAMILY MEDICINE CLINIC | Age: 43
End: 2023-02-08
Payer: MEDICAID

## 2023-02-08 ENCOUNTER — HOSPITAL ENCOUNTER (OUTPATIENT)
Facility: HOSPITAL | Age: 43
Discharge: HOME | End: 2023-02-08
Payer: MEDICAID

## 2023-02-08 VITALS
HEART RATE: 75 BPM | TEMPERATURE: 96.4 F | BODY MASS INDEX: 30.32 KG/M2 | OXYGEN SATURATION: 97 % | RESPIRATION RATE: 18 BRPM | WEIGHT: 182 LBS | SYSTOLIC BLOOD PRESSURE: 128 MMHG | DIASTOLIC BLOOD PRESSURE: 80 MMHG | HEIGHT: 65 IN

## 2023-02-08 DIAGNOSIS — R53.83 OTHER FATIGUE: ICD-10-CM

## 2023-02-08 DIAGNOSIS — Z86.73 HISTORY OF RECENT STROKE: ICD-10-CM

## 2023-02-08 DIAGNOSIS — E55.9 VITAMIN D DEFICIENCY: ICD-10-CM

## 2023-02-08 DIAGNOSIS — I82.601 THROMBOSIS OF RIGHT UPPER EXTREMITY: ICD-10-CM

## 2023-02-08 DIAGNOSIS — I10 HYPERTENSION, UNSPECIFIED TYPE: ICD-10-CM

## 2023-02-08 DIAGNOSIS — Z13.220 SCREENING, LIPID: ICD-10-CM

## 2023-02-08 DIAGNOSIS — F41.9 ANXIETY: ICD-10-CM

## 2023-02-08 DIAGNOSIS — I10 HYPERTENSION, UNSPECIFIED TYPE: Primary | ICD-10-CM

## 2023-02-08 PROCEDURE — 3079F DIAST BP 80-89 MM HG: CPT | Performed by: FAMILY MEDICINE

## 2023-02-08 PROCEDURE — 80053 COMPREHEN METABOLIC PANEL: CPT

## 2023-02-08 PROCEDURE — 36415 COLL VENOUS BLD VENIPUNCTURE: CPT

## 2023-02-08 PROCEDURE — 84443 ASSAY THYROID STIM HORMONE: CPT

## 2023-02-08 PROCEDURE — 82607 VITAMIN B-12: CPT

## 2023-02-08 PROCEDURE — 82306 VITAMIN D 25 HYDROXY: CPT

## 2023-02-08 PROCEDURE — 80061 LIPID PANEL: CPT

## 2023-02-08 PROCEDURE — 85027 COMPLETE CBC AUTOMATED: CPT

## 2023-02-08 PROCEDURE — 82746 ASSAY OF FOLIC ACID SERUM: CPT

## 2023-02-08 PROCEDURE — 3074F SYST BP LT 130 MM HG: CPT | Performed by: FAMILY MEDICINE

## 2023-02-08 PROCEDURE — 99214 OFFICE O/P EST MOD 30 MIN: CPT | Performed by: FAMILY MEDICINE

## 2023-02-08 RX ORDER — PAROXETINE 10 MG/1
10 TABLET, FILM COATED ORAL DAILY
Qty: 30 TABLET | Refills: 2 | Status: SHIPPED | OUTPATIENT
Start: 2023-02-08

## 2023-02-08 NOTE — PROGRESS NOTES
SUBJECTIVE:    Patient ID: Janna Alonzo is a 40 y.o. female. Chief Complaint   Patient presents with    Hypertension     Reg f/u       HPI: office visit  She is in the office today in follow-up of her hypertension. Her blood pressures are doing quite a bit better. She is got a follow-up CAT scan for her cerebral aneurysm tomorrow. She seems to resolve most of the weakness on her left side. Still little bit of difference in the left leg versus the right. She still having some significant difficulty using those 2 fingers on her right hand from that hematoma which she had to have removed due to her injection site for the dye for her MRI. She feels like her reaction time is getting better. She is doing her normal activities around the home. She still doing her physical therapy. She seems to be tolerating all of her medicines relatively well. She is having some increase in anxiety. She is having difficulty sleeping due to that. Review of Systems   Musculoskeletal:         Right arm pain from elbow to fingers   All other systems reviewed and are negative. OBJECTIVE:  /80   Pulse 75   Temp (!) 96.4 °F (35.8 °C)   Resp 18   Ht 5' 5\" (1.651 m)   Wt 182 lb (82.6 kg)   SpO2 97% Comment: ra  BMI 30.29 kg/m²    Wt Readings from Last 3 Encounters:   02/08/23 182 lb (82.6 kg)   01/11/23 182 lb 9.6 oz (82.8 kg)   12/28/22 186 lb (84.4 kg)     BP Readings from Last 3 Encounters:   02/08/23 128/80   01/11/23 130/78   12/28/22 134/74      Pulse Readings from Last 3 Encounters:   02/08/23 75   01/11/23 74   12/28/22 81     Body mass index is 30.29 kg/m². Resp Readings from Last 3 Encounters:   02/08/23 18   01/11/23 12   12/28/22 18     Past medical, surgical, family and social history were reviewed and updated with the patient. Physical Exam  Vitals and nursing note reviewed. Constitutional:       Appearance: Normal appearance. HENT:      Head: Normocephalic and atraumatic. Right Ear: Ear canal normal.      Left Ear: External ear normal.      Nose: Nose normal.      Mouth/Throat:      Mouth: Mucous membranes are moist.      Pharynx: Oropharynx is clear. Eyes:      Extraocular Movements: Extraocular movements intact. Conjunctiva/sclera: Conjunctivae normal.      Pupils: Pupils are equal, round, and reactive to light. Cardiovascular:      Rate and Rhythm: Normal rate and regular rhythm. Pulses: Normal pulses. Heart sounds: Normal heart sounds. Pulmonary:      Effort: Pulmonary effort is normal.      Breath sounds: Normal breath sounds. Abdominal:      General: Bowel sounds are normal.      Palpations: Abdomen is soft. Musculoskeletal:         General: Normal range of motion. Cervical back: Normal range of motion and neck supple. Comments: Still some difficulty moving moving the fourth and fifth finger of that right hand. Particularly trying to make a fist.  She does seem to have good dexterity. No significant other focal findings she has a follow-up CT tomorrow morning and then appointment with neurosurgical she has appointment for follow-up CT tomorrow morning as well as a surgical she   Skin:     General: Skin is warm and dry. Capillary Refill: Capillary refill takes less than 2 seconds. Neurological:      General: No focal deficit present. Mental Status: She is alert and oriented to person, place, and time. Psychiatric:         Attention and Perception: Attention and perception normal.         Mood and Affect: Affect normal. Mood is anxious. Speech: Speech normal.         Behavior: Behavior normal. Behavior is cooperative. Thought Content: Thought content normal.         Cognition and Memory: Cognition and memory normal.         Judgment: Judgment normal.        No results found for requested labs within last 30 days.      Microscopic Examination (no units)   Date Value   01/02/2017 YES     LDL Calculated (mg/dL)   Date Value   04/25/2017 106       Lab Results   Component Value Date/Time    WBC 7.6 10/16/2018 02:04 PM    NEUTROABS 5.0 10/16/2018 02:04 PM    HGB 14.7 10/16/2018 02:04 PM    HCT 44.1 10/16/2018 02:04 PM    MCV 92.1 10/16/2018 02:04 PM     10/16/2018 02:04 PM     Lab Results   Component Value Date    TSH 1.97 04/25/2017       ASSESSMENT/PLAN    Diagnosis Orders   1. Hypertension, unspecified type  Comprehensive Metabolic Panel    CBC      2. History of recent stroke  Follow up ct tomorrow. Sees neurology after      3. Thrombosis of right upper extremity  resolved      4. Other fatigue  TSH    Vitamin B12 & Folate      5. Vitamin D deficiency  Vitamin D 25 Hydroxy      6. Screening, lipid  LIPID PANEL      7. Anxiety  PARoxetine (PAXIL) 10 MG tablet                 Medications Discontinued During This Encounter   Medication Reason    BISACODYL 5 MG EC tablet LIST CLEANUP    oxyCODONE (ROXICODONE) 5 MG immediate release tablet LIST CLEANUP    polyethylene glycol (GLYCOLAX) 17 GM/SCOOP powder LIST CLEANUP    STIMULANT LAXATIVE 8.6-50 MG per tablet LIST CLEANUP       Controlled Substances Monitoring:      Please note: This chart was generated using Dragon dictation software. Although every effort was made to ensure the accuracy of this automated transcription, some errors in transcription may have occurred.

## 2023-02-08 NOTE — PROGRESS NOTES
Chief Complaint   Patient presents with    Hypertension     Reg f/u       Have you seen any other physician or provider since your last visit no    Have you had any other diagnostic tests since your last visit? no    Have you changed or stopped any medications since your last visit? no

## 2023-02-09 LAB
A/G RATIO: 1.7 (ref 0.8–2)
ALBUMIN SERPL-MCNC: 4.6 G/DL (ref 3.4–4.8)
ALP BLD-CCNC: 115 U/L (ref 25–100)
ALT SERPL-CCNC: 13 U/L (ref 4–36)
ANION GAP SERPL CALCULATED.3IONS-SCNC: 13 MMOL/L (ref 3–16)
AST SERPL-CCNC: 14 U/L (ref 8–33)
BILIRUB SERPL-MCNC: 0.3 MG/DL (ref 0.3–1.2)
BUN BLDV-MCNC: 19 MG/DL (ref 6–20)
CALCIUM SERPL-MCNC: 9.6 MG/DL (ref 8.5–10.5)
CHLORIDE BLD-SCNC: 98 MMOL/L (ref 98–107)
CHOLESTEROL, TOTAL: 119 MG/DL (ref 0–200)
CO2: 24 MMOL/L (ref 20–30)
CREAT SERPL-MCNC: 0.8 MG/DL (ref 0.4–1.2)
FOLATE: 8.48 NG/ML
GFR SERPL CREATININE-BSD FRML MDRD: >60 ML/MIN/{1.73_M2}
GLOBULIN: 2.7 G/DL
GLUCOSE BLD-MCNC: 88 MG/DL (ref 74–106)
HCT VFR BLD CALC: 37 % (ref 37–47)
HDLC SERPL-MCNC: 45 MG/DL (ref 40–60)
HEMOGLOBIN: 11.8 G/DL (ref 11.5–16.5)
LDL CHOLESTEROL CALCULATED: 51 MG/DL
MCH RBC QN AUTO: 29.1 PG (ref 27–32)
MCHC RBC AUTO-ENTMCNC: 31.9 G/DL (ref 31–35)
MCV RBC AUTO: 91.4 FL (ref 80–100)
PDW BLD-RTO: 13.2 % (ref 11–16)
PLATELET # BLD: 366 K/UL (ref 150–400)
PMV BLD AUTO: 11.3 FL (ref 6–10)
POTASSIUM SERPL-SCNC: 4.4 MMOL/L (ref 3.4–5.1)
RBC # BLD: 4.05 M/UL (ref 3.8–5.8)
SODIUM BLD-SCNC: 135 MMOL/L (ref 136–145)
TOTAL PROTEIN: 7.3 G/DL (ref 6.4–8.3)
TRIGL SERPL-MCNC: 113 MG/DL (ref 0–249)
TSH SERPL DL<=0.05 MIU/L-ACNC: 1.47 UIU/ML (ref 0.27–4.2)
VITAMIN B-12: 320 PG/ML (ref 211–911)
VITAMIN D 25-HYDROXY: 20 (ref 32–100)
VLDLC SERPL CALC-MCNC: 23 MG/DL
WBC # BLD: 6.8 K/UL (ref 4–11)

## 2023-03-23 NOTE — TELEPHONE ENCOUNTER
Patient called, requested refill.        Next Office Visit Date:  Future Appointments   Date Time Provider Julia Jones   3/29/2023  9:45 AM MD Raza Crowell 82 please review via [address]

## 2023-03-28 RX ORDER — HYDRALAZINE HYDROCHLORIDE 50 MG/1
50 TABLET, FILM COATED ORAL 3 TIMES DAILY
Qty: 90 TABLET | Refills: 2 | Status: SHIPPED | OUTPATIENT
Start: 2023-03-28

## 2023-03-28 RX ORDER — ASPIRIN 81 MG/1
81 TABLET, CHEWABLE ORAL DAILY
Qty: 30 TABLET | Refills: 11 | Status: SHIPPED | OUTPATIENT
Start: 2023-03-28 | End: 2023-03-29

## 2023-03-29 ENCOUNTER — OFFICE VISIT (OUTPATIENT)
Dept: FAMILY MEDICINE CLINIC | Age: 43
End: 2023-03-29
Payer: MEDICAID

## 2023-03-29 VITALS
HEART RATE: 75 BPM | HEIGHT: 65 IN | TEMPERATURE: 98 F | RESPIRATION RATE: 18 BRPM | DIASTOLIC BLOOD PRESSURE: 68 MMHG | OXYGEN SATURATION: 98 % | WEIGHT: 189 LBS | SYSTOLIC BLOOD PRESSURE: 102 MMHG | BODY MASS INDEX: 31.49 KG/M2

## 2023-03-29 DIAGNOSIS — F41.9 ANXIETY: ICD-10-CM

## 2023-03-29 DIAGNOSIS — I10 HYPERTENSION, UNSPECIFIED TYPE: Primary | ICD-10-CM

## 2023-03-29 DIAGNOSIS — E78.9 LIPID DISORDER: ICD-10-CM

## 2023-03-29 DIAGNOSIS — Z86.73 HISTORY OF RECENT STROKE: ICD-10-CM

## 2023-03-29 DIAGNOSIS — G56.21 ULNAR NERVE PALSY OF RIGHT UPPER EXTREMITY: ICD-10-CM

## 2023-03-29 PROCEDURE — 3078F DIAST BP <80 MM HG: CPT | Performed by: FAMILY MEDICINE

## 2023-03-29 PROCEDURE — 99214 OFFICE O/P EST MOD 30 MIN: CPT | Performed by: FAMILY MEDICINE

## 2023-03-29 PROCEDURE — 3074F SYST BP LT 130 MM HG: CPT | Performed by: FAMILY MEDICINE

## 2023-03-29 RX ORDER — RIVAROXABAN 10 MG/1
10 TABLET, FILM COATED ORAL
Qty: 90 TABLET | Refills: 3 | Status: SHIPPED | OUTPATIENT
Start: 2023-03-29

## 2023-03-29 RX ORDER — ATORVASTATIN CALCIUM 20 MG/1
20 TABLET, FILM COATED ORAL DAILY
Qty: 90 TABLET | Refills: 3 | Status: SHIPPED | OUTPATIENT
Start: 2023-03-29

## 2023-03-29 RX ORDER — PAROXETINE 10 MG/1
10 TABLET, FILM COATED ORAL DAILY
Qty: 90 TABLET | Refills: 3 | Status: SHIPPED | OUTPATIENT
Start: 2023-03-29

## 2023-03-29 RX ORDER — NIFEDIPINE 90 MG/1
90 TABLET, EXTENDED RELEASE ORAL DAILY
Qty: 90 TABLET | Refills: 3 | Status: SHIPPED | OUTPATIENT
Start: 2023-03-29

## 2023-03-29 SDOH — ECONOMIC STABILITY: INCOME INSECURITY: HOW HARD IS IT FOR YOU TO PAY FOR THE VERY BASICS LIKE FOOD, HOUSING, MEDICAL CARE, AND HEATING?: NOT VERY HARD

## 2023-03-29 SDOH — ECONOMIC STABILITY: HOUSING INSECURITY
IN THE LAST 12 MONTHS, WAS THERE A TIME WHEN YOU DID NOT HAVE A STEADY PLACE TO SLEEP OR SLEPT IN A SHELTER (INCLUDING NOW)?: NO

## 2023-03-29 SDOH — ECONOMIC STABILITY: FOOD INSECURITY: WITHIN THE PAST 12 MONTHS, YOU WORRIED THAT YOUR FOOD WOULD RUN OUT BEFORE YOU GOT MONEY TO BUY MORE.: NEVER TRUE

## 2023-03-29 SDOH — ECONOMIC STABILITY: FOOD INSECURITY: WITHIN THE PAST 12 MONTHS, THE FOOD YOU BOUGHT JUST DIDN'T LAST AND YOU DIDN'T HAVE MONEY TO GET MORE.: NEVER TRUE

## 2023-03-29 NOTE — PROGRESS NOTES
Chief Complaint   Patient presents with    Hypertension       Have you seen any other physician or provider since your last visit no    Have you had any other diagnostic tests since your last visit? no    Have you changed or stopped any medications since your last visit? no
Monitoring:      Please note: This chart was generated using Dragon dictation software. Although every effort was made to ensure the accuracy of this automated transcription, some errors in transcription may have occurred.

## 2023-05-16 ENCOUNTER — OFFICE VISIT (OUTPATIENT)
Dept: FAMILY MEDICINE CLINIC | Age: 43
End: 2023-05-16
Payer: MEDICAID

## 2023-05-16 VITALS
BODY MASS INDEX: 32.82 KG/M2 | OXYGEN SATURATION: 99 % | SYSTOLIC BLOOD PRESSURE: 100 MMHG | HEART RATE: 58 BPM | HEIGHT: 65 IN | DIASTOLIC BLOOD PRESSURE: 60 MMHG | WEIGHT: 197 LBS | RESPIRATION RATE: 18 BRPM | TEMPERATURE: 97.9 F

## 2023-05-16 DIAGNOSIS — J30.2 SEASONAL ALLERGIES: ICD-10-CM

## 2023-05-16 DIAGNOSIS — I10 HYPERTENSION, UNSPECIFIED TYPE: Primary | ICD-10-CM

## 2023-05-16 DIAGNOSIS — G56.21 ULNAR NERVE PALSY OF RIGHT UPPER EXTREMITY: ICD-10-CM

## 2023-05-16 DIAGNOSIS — I10 PRIMARY HYPERTENSION: ICD-10-CM

## 2023-05-16 PROCEDURE — 3074F SYST BP LT 130 MM HG: CPT | Performed by: FAMILY MEDICINE

## 2023-05-16 PROCEDURE — 99213 OFFICE O/P EST LOW 20 MIN: CPT | Performed by: FAMILY MEDICINE

## 2023-05-16 PROCEDURE — 3078F DIAST BP <80 MM HG: CPT | Performed by: FAMILY MEDICINE

## 2023-05-16 RX ORDER — FLUTICASONE PROPIONATE 50 MCG
2 SPRAY, SUSPENSION (ML) NASAL DAILY
Qty: 48 G | Refills: 1 | Status: SHIPPED | OUTPATIENT
Start: 2023-05-16

## 2023-05-16 RX ORDER — CETIRIZINE HYDROCHLORIDE 10 MG/1
10 TABLET ORAL DAILY
Qty: 30 TABLET | Refills: 5 | Status: SHIPPED | OUTPATIENT
Start: 2023-05-16

## 2023-07-05 RX ORDER — HYDRALAZINE HYDROCHLORIDE 50 MG/1
50 TABLET, FILM COATED ORAL 3 TIMES DAILY
Qty: 270 TABLET | Refills: 3 | Status: SHIPPED | OUTPATIENT
Start: 2023-07-05

## 2023-07-05 NOTE — TELEPHONE ENCOUNTER
Patient called, requested refill. Next Office Visit Date:  No future appointments.     KENNEY please review via [address]

## 2024-02-06 ENCOUNTER — OFFICE VISIT (OUTPATIENT)
Dept: FAMILY MEDICINE CLINIC | Age: 44
End: 2024-02-06
Payer: MEDICAID

## 2024-02-06 ENCOUNTER — HOSPITAL ENCOUNTER (OUTPATIENT)
Facility: HOSPITAL | Age: 44
Discharge: HOME | End: 2024-02-06
Payer: MEDICAID

## 2024-02-06 VITALS
TEMPERATURE: 97.9 F | DIASTOLIC BLOOD PRESSURE: 84 MMHG | OXYGEN SATURATION: 99 % | HEIGHT: 65 IN | SYSTOLIC BLOOD PRESSURE: 114 MMHG | HEART RATE: 78 BPM | RESPIRATION RATE: 16 BRPM | WEIGHT: 214.2 LBS | BODY MASS INDEX: 35.69 KG/M2

## 2024-02-06 DIAGNOSIS — E78.9 LIPID DISORDER: ICD-10-CM

## 2024-02-06 DIAGNOSIS — L85.3 DRY SKIN: Primary | ICD-10-CM

## 2024-02-06 DIAGNOSIS — N92.0 MENORRHAGIA WITH REGULAR CYCLE: ICD-10-CM

## 2024-02-06 DIAGNOSIS — R63.5 WEIGHT GAIN: ICD-10-CM

## 2024-02-06 DIAGNOSIS — I10 HYPERTENSION, UNSPECIFIED TYPE: ICD-10-CM

## 2024-02-06 DIAGNOSIS — L85.3 DRY SKIN: ICD-10-CM

## 2024-02-06 DIAGNOSIS — M62.838 MUSCLE SPASM: ICD-10-CM

## 2024-02-06 DIAGNOSIS — R53.83 OTHER FATIGUE: ICD-10-CM

## 2024-02-06 DIAGNOSIS — E55.9 VITAMIN D DEFICIENCY: ICD-10-CM

## 2024-02-06 LAB
25(OH)D3 SERPL-MCNC: 18.2 NG/ML (ref 32–100)
ALBUMIN SERPL-MCNC: 4.4 G/DL (ref 3.4–4.8)
ALBUMIN/GLOB SERPL: 1.8 {RATIO} (ref 0.8–2)
ALP SERPL-CCNC: 98 U/L (ref 25–100)
ALT SERPL-CCNC: 24 U/L (ref 4–36)
ANION GAP SERPL CALCULATED.3IONS-SCNC: 11 MMOL/L (ref 3–16)
AST SERPL-CCNC: 22 U/L (ref 8–33)
BILIRUB SERPL-MCNC: 0.3 MG/DL (ref 0.3–1.2)
BUN SERPL-MCNC: 14 MG/DL (ref 6–20)
CALCIUM SERPL-MCNC: 9.2 MG/DL (ref 8.5–10.5)
CHLORIDE SERPL-SCNC: 102 MMOL/L (ref 98–107)
CHOLEST SERPL-MCNC: 126 MG/DL (ref 0–200)
CO2 SERPL-SCNC: 25 MMOL/L (ref 20–30)
CREAT SERPL-MCNC: 0.9 MG/DL (ref 0.4–1.2)
ERYTHROCYTE [DISTWIDTH] IN BLOOD BY AUTOMATED COUNT: 16 % (ref 11–16)
FOLATE SERPL-MCNC: >20 NG/ML
GFR SERPLBLD CREATININE-BSD FMLA CKD-EPI: >60 ML/MIN/{1.73_M2}
GLOBULIN SER CALC-MCNC: 2.4 G/DL
GLUCOSE SERPL-MCNC: 89 MG/DL (ref 74–106)
HCT VFR BLD AUTO: 34.4 % (ref 37–47)
HDLC SERPL-MCNC: 49 MG/DL (ref 40–60)
HGB BLD-MCNC: 10.6 G/DL (ref 11.5–16.5)
LDLC SERPL CALC-MCNC: 59 MG/DL
MAGNESIUM SERPL-MCNC: 1.9 MG/DL (ref 1.7–2.4)
MCH RBC QN AUTO: 24.5 PG (ref 27–32)
MCHC RBC AUTO-ENTMCNC: 30.8 G/DL (ref 31–35)
MCV RBC AUTO: 79.6 FL (ref 80–100)
PLATELET # BLD AUTO: 416 K/UL (ref 150–400)
PMV BLD AUTO: 10.7 FL (ref 6–10)
POTASSIUM SERPL-SCNC: 4.9 MMOL/L (ref 3.4–5.1)
PROT SERPL-MCNC: 6.8 G/DL (ref 6.4–8.3)
RBC # BLD AUTO: 4.32 M/UL (ref 3.8–5.8)
SODIUM SERPL-SCNC: 138 MMOL/L (ref 136–145)
T4 FREE SERPL-MCNC: 1.23 NG/DL (ref 0.89–1.76)
TRIGL SERPL-MCNC: 88 MG/DL (ref 0–249)
TSH SERPL DL<=0.005 MIU/L-ACNC: 1.71 UIU/ML (ref 0.27–4.2)
VIT B12 SERPL-MCNC: 380 PG/ML (ref 211–911)
VLDLC SERPL CALC-MCNC: 18 MG/DL
WBC # BLD AUTO: 6.7 K/UL (ref 4–11)

## 2024-02-06 PROCEDURE — 83735 ASSAY OF MAGNESIUM: CPT

## 2024-02-06 PROCEDURE — 85027 COMPLETE CBC AUTOMATED: CPT

## 2024-02-06 PROCEDURE — 82306 VITAMIN D 25 HYDROXY: CPT

## 2024-02-06 PROCEDURE — 99214 OFFICE O/P EST MOD 30 MIN: CPT | Performed by: FAMILY MEDICINE

## 2024-02-06 PROCEDURE — 83001 ASSAY OF GONADOTROPIN (FSH): CPT

## 2024-02-06 PROCEDURE — 83002 ASSAY OF GONADOTROPIN (LH): CPT

## 2024-02-06 PROCEDURE — 84439 ASSAY OF FREE THYROXINE: CPT

## 2024-02-06 PROCEDURE — 84443 ASSAY THYROID STIM HORMONE: CPT

## 2024-02-06 PROCEDURE — 3074F SYST BP LT 130 MM HG: CPT | Performed by: FAMILY MEDICINE

## 2024-02-06 PROCEDURE — 82746 ASSAY OF FOLIC ACID SERUM: CPT

## 2024-02-06 PROCEDURE — 80061 LIPID PANEL: CPT

## 2024-02-06 PROCEDURE — 3079F DIAST BP 80-89 MM HG: CPT | Performed by: FAMILY MEDICINE

## 2024-02-06 PROCEDURE — 82607 VITAMIN B-12: CPT

## 2024-02-06 PROCEDURE — 80053 COMPREHEN METABOLIC PANEL: CPT

## 2024-02-06 PROCEDURE — 36415 COLL VENOUS BLD VENIPUNCTURE: CPT

## 2024-02-06 RX ORDER — EPINEPHRINE 0.3 MG/.3ML
INJECTION SUBCUTANEOUS
Qty: 2 EACH | Refills: 5 | Status: SHIPPED | OUTPATIENT
Start: 2024-02-06

## 2024-02-06 NOTE — PATIENT INSTRUCTIONS
bag.  Cover up or remove any of your personal information on the empty containers by covering it with black permanent marker or duct tape.  Place the sealed container with the mixture, and the empty drug containers, in the trash.   If you use a medication that is in the form of a patch, dispose of used patches by folding them in half so that the sticky sides meet, and then flushing them down a toilet. They should not be placed in the household trash where children or pets can find them.  If you have any questions, ask your provider or pharmacist for more information.   Be sure to keep all appointments for provider visits or tests.

## 2024-02-06 NOTE — PROGRESS NOTES
Chief Complaint   Patient presents with    Skin Problem     2 moles on her back, patient thinks that they have gotten bigger and darker in color.  On her right upper arm she has a spot.    Weight Management     Patient reports that she would like to discuss weight management.    Menopause     Patient thinks that she is starting pre menopause.  Her periods has been different, skin feels different, hair is thinning, anxiety increased.        Have you seen any other physician or provider since your last visit no    Have you had any other diagnostic tests since your last visit? no    Have you changed or stopped any medications since your last visit? no

## 2024-02-06 NOTE — PROGRESS NOTES
SUBJECTIVE:    Patient ID: Lenora Last is a 43 y.o. female.    Chief Complaint   Patient presents with    Skin Problem     2 moles on her back, patient thinks that they have gotten bigger and darker in color.  On her right upper arm she has a spot.    Weight Management     Patient reports that she would like to discuss weight management.    Menopause     Patient thinks that she is starting pre menopause.  Her periods has been different, skin feels different, hair is thinning, anxiety increased.        HPI: office visit  She is in the office today complaining of some hair thinning.  She feels like she is having some increased anxiety.  She feels like she is having some menopausal type symptoms.  She is having some weight gain.  She does seem to be doing better with her right arm.  She is using her hand somewhat better.  She is had good blood pressure readings at home.  She seems to be doing okay with her other medications.  She has not had any recent falls or injuries.  Her back that have been enlarged quite a bit recently.  She says they are getting black.  She is concerned about that.    Review of Systems   Musculoskeletal:         Right arm pain from elbow to fingers   All other systems reviewed and are negative.       OBJECTIVE:  /84   Pulse 78   Temp 97.9 °F (36.6 °C) (Infrared)   Resp 16   Ht 1.651 m (5' 5\")   Wt 97.2 kg (214 lb 3.2 oz)   SpO2 99% Comment: ra  BMI 35.64 kg/m²    Wt Readings from Last 3 Encounters:   02/06/24 97.2 kg (214 lb 3.2 oz)   05/16/23 89.4 kg (197 lb)   03/29/23 85.7 kg (189 lb)     BP Readings from Last 3 Encounters:   02/06/24 114/84   05/16/23 100/60   03/29/23 102/68      Pulse Readings from Last 3 Encounters:   02/06/24 78   05/16/23 58   03/29/23 75     Body mass index is 35.64 kg/m².   Resp Readings from Last 3 Encounters:   02/06/24 16   05/16/23 18   03/29/23 18     Past medical, surgical, family and social history were reviewed and updated with the

## 2024-02-07 LAB
FSH SERPL-ACNC: 13.8 MIU/ML
LH SERPL-ACNC: 9.6 MIU/ML

## 2024-02-08 RX ORDER — ERGOCALCIFEROL 1.25 MG/1
50000 CAPSULE ORAL WEEKLY
Qty: 12 CAPSULE | Refills: 1 | Status: SHIPPED | OUTPATIENT
Start: 2024-02-08

## 2024-02-15 ENCOUNTER — NURSE ONLY (OUTPATIENT)
Dept: FAMILY MEDICINE CLINIC | Age: 44
End: 2024-02-15

## 2024-02-15 DIAGNOSIS — E53.8 B12 DEFICIENCY: Primary | ICD-10-CM

## 2024-02-15 RX ORDER — CYANOCOBALAMIN 1000 UG/ML
1000 INJECTION, SOLUTION INTRAMUSCULAR; SUBCUTANEOUS ONCE
Status: COMPLETED | OUTPATIENT
Start: 2024-02-15 | End: 2024-02-15

## 2024-02-15 RX ADMIN — CYANOCOBALAMIN 1000 MCG: 1000 INJECTION, SOLUTION INTRAMUSCULAR; SUBCUTANEOUS at 11:46

## 2024-02-15 NOTE — PROGRESS NOTES
Patient tolerated injection well. Patient advised to wait 20 minutes in the office following the injection. No signs/symptoms of reaction noted after 20 minutes.  Administrations This Visit       cyanocobalamin injection 1,000 mcg       Admin Date  02/15/2024  11:46 Action  Given Dose  1,000 mcg Route  IntraMUSCular Site  Deltoid Right Administered By  Moncho Oneill LPN    Ordering Provider: Alyssa Mcbride MD    NDC: 03999-607-21    Lot#: [id number]    : [employer] [facility]    Patient Supplied?: No

## 2024-02-26 ENCOUNTER — NURSE ONLY (OUTPATIENT)
Dept: FAMILY MEDICINE CLINIC | Age: 44
End: 2024-02-26
Payer: MEDICAID

## 2024-02-26 DIAGNOSIS — E53.8 B12 DEFICIENCY: Primary | ICD-10-CM

## 2024-02-26 PROCEDURE — 96372 THER/PROPH/DIAG INJ SC/IM: CPT | Performed by: FAMILY MEDICINE

## 2024-02-26 RX ORDER — CYANOCOBALAMIN 1000 UG/ML
1000 INJECTION, SOLUTION INTRAMUSCULAR; SUBCUTANEOUS ONCE
Status: COMPLETED | OUTPATIENT
Start: 2024-02-26 | End: 2024-02-26

## 2024-02-26 RX ADMIN — CYANOCOBALAMIN 1000 MCG: 1000 INJECTION, SOLUTION INTRAMUSCULAR; SUBCUTANEOUS at 15:34

## 2024-02-26 NOTE — PROGRESS NOTES
Patient tolerated injection well. Patient advised to wait 20 minutes in the office following the injection. No signs/symptoms of reaction noted after 20 minutes.  Administrations This Visit       cyanocobalamin injection 1,000 mcg       Admin Date  02/26/2024  15:34 Action  Given Dose  1,000 mcg Route  IntraMUSCular Site  Deltoid Right Administered By  Moncho Oneill LPN    Ordering Provider: Alyssa Mcbride MD    NDC: 54023-147-21    Lot#: [id number]    : [employer] [facility]    Patient Supplied?: No

## 2024-03-05 ENCOUNTER — OFFICE VISIT (OUTPATIENT)
Dept: FAMILY MEDICINE CLINIC | Age: 44
End: 2024-03-05

## 2024-03-05 VITALS
HEIGHT: 65 IN | OXYGEN SATURATION: 96 % | WEIGHT: 214.2 LBS | RESPIRATION RATE: 16 BRPM | TEMPERATURE: 97.4 F | HEART RATE: 82 BPM | DIASTOLIC BLOOD PRESSURE: 84 MMHG | BODY MASS INDEX: 35.69 KG/M2 | SYSTOLIC BLOOD PRESSURE: 132 MMHG

## 2024-03-05 DIAGNOSIS — G56.21 ULNAR NERVE PALSY OF RIGHT UPPER EXTREMITY: ICD-10-CM

## 2024-03-05 DIAGNOSIS — Z86.73 HISTORY OF RECENT STROKE: ICD-10-CM

## 2024-03-05 DIAGNOSIS — E53.8 B12 DEFICIENCY: ICD-10-CM

## 2024-03-05 DIAGNOSIS — J30.2 SEASONAL ALLERGIES: ICD-10-CM

## 2024-03-05 DIAGNOSIS — I10 HYPERTENSION, UNSPECIFIED TYPE: Primary | ICD-10-CM

## 2024-03-05 DIAGNOSIS — R09.81 HEAD CONGESTION: ICD-10-CM

## 2024-03-05 DIAGNOSIS — E55.9 VITAMIN D DEFICIENCY: ICD-10-CM

## 2024-03-05 RX ORDER — CYANOCOBALAMIN 1000 UG/ML
1000 INJECTION, SOLUTION INTRAMUSCULAR; SUBCUTANEOUS ONCE
Status: COMPLETED | OUTPATIENT
Start: 2024-03-05 | End: 2024-03-05

## 2024-03-05 RX ADMIN — CYANOCOBALAMIN 1000 MCG: 1000 INJECTION, SOLUTION INTRAMUSCULAR; SUBCUTANEOUS at 11:45

## 2024-03-05 NOTE — PROGRESS NOTES
Chief Complaint   Patient presents with    Hypertension     Reg F/U       Have you seen any other physician or provider since your last visit no    Have you had any other diagnostic tests since your last visit? no    Have you changed or stopped any medications since your last visit? no   Administrations This Visit       cyanocobalamin injection 1,000 mcg       Admin Date  03/05/2024  11:45 Action  Given Dose  1,000 mcg Route  IntraMUSCular Site  Deltoid Left Administered By  Cecile Magana MA    Ordering Provider: Alyssa Mcbride MD    NDC: 34348-529-09    : [employer]    Patient Supplied?: No                 Patient tolerated injection well. Patient advised to wait 20 minutes in the office following the injection. No signs/symptoms of reaction noted after 20 minutes.      
 02/06/2024 11:47 AM     Lab Results   Component Value Date    TSH 1.71 02/06/2024       ASSESSMENT/PLAN    Diagnosis Orders   1. Hypertension, unspecified type  CBC    Comprehensive Metabolic Panel      2. Vitamin D deficiency  cyanocobalamin injection 1,000 mcg    Vitamin D 25 Hydroxy      3. B12 deficiency  Vitamin B12 & Folate      4. Seasonal allergies        5. Head congestion        6. History of recent stroke        7. 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 Although every effort was made to ensure the accuracy of this automated transcription, some errors in transcription may have occurred.

## 2024-03-06 ENCOUNTER — TELEPHONE (OUTPATIENT)
Dept: FAMILY MEDICINE CLINIC | Age: 44
End: 2024-03-06

## 2024-03-06 NOTE — TELEPHONE ENCOUNTER
Patient called and stated that you were going to send her in something for her sinuses and some mucinex. She was just wondering if you could send that to [facility] in [place]?

## 2024-03-08 RX ORDER — SULFAMETHOXAZOLE AND TRIMETHOPRIM 800; 160 MG/1; MG/1
1 TABLET ORAL 2 TIMES DAILY
Qty: 20 TABLET | Refills: 0 | Status: SHIPPED | OUTPATIENT
Start: 2024-03-08 | End: 2024-03-18

## 2024-03-08 RX ORDER — GUAIFENESIN 600 MG/1
1200 TABLET, EXTENDED RELEASE ORAL 2 TIMES DAILY PRN
Qty: 30 TABLET | Refills: 0 | Status: SHIPPED | OUTPATIENT
Start: 2024-03-08

## 2024-03-15 ENCOUNTER — NURSE ONLY (OUTPATIENT)
Dept: FAMILY MEDICINE CLINIC | Age: 44
End: 2024-03-15

## 2024-03-15 DIAGNOSIS — E53.8 B12 DEFICIENCY: Primary | ICD-10-CM

## 2024-03-15 RX ORDER — CYANOCOBALAMIN 1000 UG/ML
1000 INJECTION, SOLUTION INTRAMUSCULAR; SUBCUTANEOUS ONCE
Status: COMPLETED | OUTPATIENT
Start: 2024-03-15 | End: 2024-03-15

## 2024-03-15 RX ADMIN — CYANOCOBALAMIN 1000 MCG: 1000 INJECTION, SOLUTION INTRAMUSCULAR; SUBCUTANEOUS at 09:31

## 2024-03-15 NOTE — PROGRESS NOTES
Patient tolerated injection well. Patient advised to wait 20 minutes in the office following the injection. No signs/symptoms of reaction noted after 20 minutes.  Administrations This Visit       cyanocobalamin injection 1,000 mcg       Admin Date  03/15/2024  09:31 Action  Given Dose  1,000 mcg Route  IntraMUSCular Site  Deltoid Left Administered By  Moncoh Oneill LPN    Ordering Provider: Alyssa Mcbride MD    NDC: 73554-296-36    Lot#: [id number]    : [facility]    Patient Supplied?: No

## 2024-04-03 DIAGNOSIS — Z86.73 HISTORY OF RECENT STROKE: ICD-10-CM

## 2024-04-03 DIAGNOSIS — I10 HYPERTENSION, UNSPECIFIED TYPE: ICD-10-CM

## 2024-04-03 DIAGNOSIS — E78.9 LIPID DISORDER: ICD-10-CM

## 2024-04-03 RX ORDER — RIVAROXABAN 10 MG/1
10 TABLET, FILM COATED ORAL
Qty: 90 TABLET | Refills: 3 | Status: SHIPPED | OUTPATIENT
Start: 2024-04-03

## 2024-04-03 RX ORDER — ATORVASTATIN CALCIUM 20 MG/1
20 TABLET, FILM COATED ORAL DAILY
Qty: 90 TABLET | Refills: 3 | Status: SHIPPED | OUTPATIENT
Start: 2024-04-03

## 2024-04-04 DIAGNOSIS — F41.9 ANXIETY: ICD-10-CM

## 2024-04-04 NOTE — TELEPHONE ENCOUNTER
Refill request received from pharmacy      Next Office Visit Date:  Future Appointments   Date Time Provider Department Center   6/5/2024 10:45 AM Alyssa Mcbride MD [facility] - Please review via PDMP        Last Office Visit:    3/5/2024

## 2024-04-05 RX ORDER — PAROXETINE 10 MG/1
10 TABLET, FILM COATED ORAL DAILY
Qty: 90 TABLET | Refills: 3 | Status: SHIPPED | OUTPATIENT
Start: 2024-04-05

## 2024-04-27 DIAGNOSIS — I10 HYPERTENSION, UNSPECIFIED TYPE: ICD-10-CM

## 2024-04-29 RX ORDER — NIFEDIPINE 90 MG/1
90 TABLET, EXTENDED RELEASE ORAL DAILY
Qty: 90 TABLET | Refills: 3 | Status: SHIPPED | OUTPATIENT
Start: 2024-04-29

## 2024-04-29 NOTE — TELEPHONE ENCOUNTER
Refill request received from pharmacy      Next Office Visit Date:  Future Appointments   Date Time Provider Department Center   6/5/2024 10:45 AM Alyssa Mcbride MD [facility] [place] [facility] - Please review via PDMP        Last Office Visit:    3/5/2024

## 2024-05-13 ENCOUNTER — NURSE ONLY (OUTPATIENT)
Dept: FAMILY MEDICINE CLINIC | Age: 44
End: 2024-05-13
Payer: MEDICAID

## 2024-05-13 DIAGNOSIS — E53.8 B12 DEFICIENCY: Primary | ICD-10-CM

## 2024-05-13 PROCEDURE — 96372 THER/PROPH/DIAG INJ SC/IM: CPT

## 2024-05-13 RX ORDER — CYANOCOBALAMIN 1000 UG/ML
1000 INJECTION, SOLUTION INTRAMUSCULAR; SUBCUTANEOUS ONCE
Status: COMPLETED | OUTPATIENT
Start: 2024-05-13 | End: 2024-05-13

## 2024-05-13 RX ADMIN — CYANOCOBALAMIN 1000 MCG: 1000 INJECTION, SOLUTION INTRAMUSCULAR; SUBCUTANEOUS at 15:29

## 2024-07-03 RX ORDER — HYDRALAZINE HYDROCHLORIDE 50 MG/1
50 TABLET, FILM COATED ORAL 3 TIMES DAILY
Qty: 270 TABLET | Refills: 3 | Status: SHIPPED | OUTPATIENT
Start: 2024-07-03

## 2024-07-03 NOTE — TELEPHONE ENCOUNTER
Refill request received from pharmacy      Next Office Visit Date:  No future appointments.    [facility] - Please review via PDMP        Last Office Visit:    3/5/2024

## 2024-11-13 ENCOUNTER — OFFICE VISIT (OUTPATIENT)
Dept: FAMILY MEDICINE CLINIC | Age: 44
End: 2024-11-13
Payer: MEDICAID

## 2024-11-13 ENCOUNTER — HOSPITAL ENCOUNTER (OUTPATIENT)
Facility: HOSPITAL | Age: 44
Discharge: HOME | End: 2024-11-13

## 2024-11-13 VITALS
OXYGEN SATURATION: 98 % | HEART RATE: 74 BPM | RESPIRATION RATE: 18 BRPM | WEIGHT: 227.8 LBS | BODY MASS INDEX: 37.95 KG/M2 | TEMPERATURE: 97.5 F | SYSTOLIC BLOOD PRESSURE: 128 MMHG | HEIGHT: 65 IN | DIASTOLIC BLOOD PRESSURE: 68 MMHG

## 2024-11-13 DIAGNOSIS — J01.90 ACUTE BACTERIAL SINUSITIS: ICD-10-CM

## 2024-11-13 DIAGNOSIS — B96.89 ACUTE BACTERIAL SINUSITIS: ICD-10-CM

## 2024-11-13 DIAGNOSIS — R05.1 ACUTE COUGH: ICD-10-CM

## 2024-11-13 DIAGNOSIS — E55.9 VITAMIN D DEFICIENCY: ICD-10-CM

## 2024-11-13 DIAGNOSIS — R53.83 OTHER FATIGUE: Primary | ICD-10-CM

## 2024-11-13 LAB
INFLUENZA A ANTIBODY: NEGATIVE
INFLUENZA B ANTIBODY: NEGATIVE
Lab: NORMAL
QC PASS/FAIL: NORMAL
S PYO AG THROAT QL: NORMAL
SARS-COV-2 RDRP RESP QL NAA+PROBE: NEGATIVE

## 2024-11-13 PROCEDURE — 3078F DIAST BP <80 MM HG: CPT

## 2024-11-13 PROCEDURE — 87635 SARS-COV-2 COVID-19 AMP PRB: CPT

## 2024-11-13 PROCEDURE — 87880 STREP A ASSAY W/OPTIC: CPT

## 2024-11-13 PROCEDURE — 87804 INFLUENZA ASSAY W/OPTIC: CPT

## 2024-11-13 PROCEDURE — 3074F SYST BP LT 130 MM HG: CPT

## 2024-11-13 PROCEDURE — 99213 OFFICE O/P EST LOW 20 MIN: CPT

## 2024-11-13 RX ORDER — ERGOCALCIFEROL 1.25 MG/1
50000 CAPSULE, LIQUID FILLED ORAL WEEKLY
Qty: 12 CAPSULE | Refills: 1 | Status: SHIPPED | OUTPATIENT
Start: 2024-11-13

## 2024-11-13 RX ORDER — BROMPHENIRAMINE MALEATE, PSEUDOEPHEDRINE HYDROCHLORIDE, AND DEXTROMETHORPHAN HYDROBROMIDE 2; 30; 10 MG/5ML; MG/5ML; MG/5ML
5 SYRUP ORAL 4 TIMES DAILY PRN
Qty: 473 ML | Refills: 0 | Status: SHIPPED | OUTPATIENT
Start: 2024-11-13

## 2024-11-13 RX ORDER — METHYLPREDNISOLONE 4 MG/1
TABLET ORAL
Qty: 1 KIT | Refills: 0 | Status: SHIPPED | OUTPATIENT
Start: 2024-11-13 | End: 2024-11-19

## 2024-11-13 RX ORDER — DOXYCYCLINE HYCLATE 100 MG
100 TABLET ORAL 2 TIMES DAILY
Qty: 20 TABLET | Refills: 0 | Status: SHIPPED | OUTPATIENT
Start: 2024-11-13 | End: 2024-11-23

## 2024-11-13 SDOH — ECONOMIC STABILITY: FOOD INSECURITY: WITHIN THE PAST 12 MONTHS, THE FOOD YOU BOUGHT JUST DIDN'T LAST AND YOU DIDN'T HAVE MONEY TO GET MORE.: NEVER TRUE

## 2024-11-13 SDOH — ECONOMIC STABILITY: FOOD INSECURITY: WITHIN THE PAST 12 MONTHS, YOU WORRIED THAT YOUR FOOD WOULD RUN OUT BEFORE YOU GOT MONEY TO BUY MORE.: NEVER TRUE

## 2024-11-13 SDOH — ECONOMIC STABILITY: INCOME INSECURITY: HOW HARD IS IT FOR YOU TO PAY FOR THE VERY BASICS LIKE FOOD, HOUSING, MEDICAL CARE, AND HEATING?: NOT HARD AT ALL

## 2024-11-13 ASSESSMENT — ENCOUNTER SYMPTOMS
EYES NEGATIVE: 1
SINUS PRESSURE: 1
COUGH: 1
GASTROINTESTINAL NEGATIVE: 1
SORE THROAT: 1
ALLERGIC/IMMUNOLOGIC NEGATIVE: 1

## 2024-11-13 NOTE — PROGRESS NOTES
Patient here today for sick visit only. Health Maintenance not reviewed during this visit.    
\"[facility]\"      Lab Results   Component Value Date/Time    WBC 6.7 02/06/2024 11:47 AM    NEUTROABS 5.0 10/16/2018 02:04 PM    HGB 10.6 02/06/2024 11:47 AM    HCT 34.4 02/06/2024 11:47 AM    MCV 79.6 02/06/2024 11:47 AM     02/06/2024 11:47 AM       Lab Results   Component Value Date    TSH 1.71 02/06/2024         ASSESSMENT/PLAN:  1. Other fatigue  -     POCT COVID-19 Rapid, NAAT  -     POCT Influenza A/B  -     POCT rapid strep A  2. Vitamin D deficiency  -     vitamin D (ERGOCALCIFEROL) 1.25 MG (54017 UT) CAPS capsule; Take 1 capsule by mouth once a week, Disp-12 capsule, R-1Normal  3. Acute bacterial sinusitis  -     doxycycline hyclate (VIBRA-TABS) 100 MG tablet; Take 1 tablet by mouth 2 times daily for 10 days, Disp-20 tablet, R-0Normal  -     methylPREDNISolone (MEDROL DOSEPACK) 4 MG tablet; Take by mouth., Disp-1 kit, R-0Normal  4. Acute cough  -     brompheniramine-pseudoephedrine-DM 2-30-10 MG/5ML syrup; Take 5 mLs by mouth 4 times daily as needed for Cough, Disp-473 mL, R-0Normal       Orders Placed This Encounter   Procedures    POCT COVID-19 Rapid, NAAT    POCT Influenza A/B    POCT rapid strep A        Outpatient Encounter Medications as of 11/13/2024   Medication Sig Dispense Refill    vitamin D (ERGOCALCIFEROL) 1.25 MG (85636 UT) CAPS capsule Take 1 capsule by mouth once a week 12 capsule 1    doxycycline hyclate (VIBRA-TABS) 100 MG tablet Take 1 tablet by mouth 2 times daily for 10 days 20 tablet 0    methylPREDNISolone (MEDROL DOSEPACK) 4 MG tablet Take by mouth. 1 kit 0    brompheniramine-pseudoephedrine-DM 2-30-10 MG/5ML syrup Take 5 mLs by mouth 4 times daily as needed for Cough 473 mL 0    hydrALAZINE (APRESOLINE) 50 MG tablet TAKE ONE TABLET BY MOUTH THREE TIMES A  tablet 3    PARoxetine (PAXIL) 10 MG tablet TAKE ONE TABLET BY MOUTH DAILY 90 tablet 3    metoprolol tartrate (LOPRESSOR) 25 MG tablet TAKE ONE TABLET BY MOUTH DAILY 90 tablet 3    atorvastatin (LIPITOR) 20 MG tablet

## 2025-04-22 DIAGNOSIS — E78.9 LIPID DISORDER: ICD-10-CM

## 2025-04-22 DIAGNOSIS — Z86.73 HISTORY OF RECENT STROKE: ICD-10-CM

## 2025-04-22 DIAGNOSIS — I10 HYPERTENSION, UNSPECIFIED TYPE: ICD-10-CM

## 2025-04-22 DIAGNOSIS — F41.9 ANXIETY: ICD-10-CM

## 2025-04-22 RX ORDER — METOPROLOL TARTRATE 25 MG/1
25 TABLET, FILM COATED ORAL DAILY
Qty: 90 TABLET | Refills: 3 | Status: SHIPPED | OUTPATIENT
Start: 2025-04-22

## 2025-04-22 RX ORDER — ATORVASTATIN CALCIUM 20 MG/1
20 TABLET, FILM COATED ORAL DAILY
Qty: 90 TABLET | Refills: 3 | Status: SHIPPED | OUTPATIENT
Start: 2025-04-22

## 2025-04-22 RX ORDER — RIVAROXABAN 10 MG/1
10 TABLET, FILM COATED ORAL DAILY
Qty: 90 TABLET | Refills: 3 | Status: SHIPPED | OUTPATIENT
Start: 2025-04-22

## 2025-04-22 RX ORDER — PAROXETINE 10 MG/1
10 TABLET, FILM COATED ORAL DAILY
Qty: 90 TABLET | Refills: 3 | Status: SHIPPED | OUTPATIENT
Start: 2025-04-22

## 2025-04-22 NOTE — TELEPHONE ENCOUNTER
Refill request received from pharmacy      Next Office Visit Date:  No future appointments.    KENNEY - Please review via PDMP        Last Office Visit:    11/13/24

## 2025-04-25 DIAGNOSIS — E55.9 VITAMIN D DEFICIENCY: ICD-10-CM

## 2025-04-25 RX ORDER — ERGOCALCIFEROL 1.25 MG/1
50000 CAPSULE, LIQUID FILLED ORAL WEEKLY
Qty: 12 CAPSULE | Refills: 1 | Status: SHIPPED | OUTPATIENT
Start: 2025-04-25

## 2025-04-27 DIAGNOSIS — F41.9 ANXIETY: ICD-10-CM

## 2025-04-28 RX ORDER — PAROXETINE 10 MG/1
10 TABLET, FILM COATED ORAL DAILY
Qty: 90 TABLET | Refills: 3 | OUTPATIENT
Start: 2025-04-28